# Patient Record
Sex: FEMALE | Race: ASIAN | NOT HISPANIC OR LATINO | Employment: FULL TIME | URBAN - METROPOLITAN AREA
[De-identification: names, ages, dates, MRNs, and addresses within clinical notes are randomized per-mention and may not be internally consistent; named-entity substitution may affect disease eponyms.]

---

## 2017-06-06 DIAGNOSIS — E03.9 HYPOTHYROIDISM: ICD-10-CM

## 2017-11-14 ENCOUNTER — ALLSCRIPTS OFFICE VISIT (OUTPATIENT)
Dept: OTHER | Facility: OTHER | Age: 47
End: 2017-11-14

## 2017-11-15 ENCOUNTER — LAB CONVERSION - ENCOUNTER (OUTPATIENT)
Dept: OTHER | Facility: OTHER | Age: 47
End: 2017-11-15

## 2017-11-15 LAB
A/G RATIO (HISTORICAL): 1.2 (CALC) (ref 1–2.5)
ALBUMIN SERPL BCP-MCNC: 4.4 G/DL (ref 3.6–5.1)
ALP SERPL-CCNC: 92 U/L (ref 33–115)
ALT SERPL W P-5'-P-CCNC: 24 U/L (ref 6–29)
AST SERPL W P-5'-P-CCNC: 24 U/L (ref 10–35)
BASOPHILS # BLD AUTO: 0.5 %
BASOPHILS # BLD AUTO: 34 CELLS/UL (ref 0–200)
BILIRUB SERPL-MCNC: 0.4 MG/DL (ref 0.2–1.2)
BUN SERPL-MCNC: 13 MG/DL (ref 7–25)
BUN/CREA RATIO (HISTORICAL): NORMAL (CALC) (ref 6–22)
CALCIUM SERPL-MCNC: 9.6 MG/DL (ref 8.6–10.2)
CHLORIDE SERPL-SCNC: 105 MMOL/L (ref 98–110)
CLINICAL COMMENT (HISTORICAL): ABNORMAL
CO2 SERPL-SCNC: 26 MMOL/L (ref 20–31)
CREAT SERPL-MCNC: 0.75 MG/DL (ref 0.5–1.1)
DEPRECATED RDW RBC AUTO: 16.6 % (ref 11–15)
EGFR AFRICAN AMERICAN (HISTORICAL): 108 ML/MIN/1.73M2
EGFR-AMERICAN CALC (HISTORICAL): 94 ML/MIN/1.73M2
EOSINOPHIL # BLD AUTO: 174 CELLS/UL (ref 15–500)
EOSINOPHIL # BLD AUTO: 2.6 %
GAMMA GLOBULIN (HISTORICAL): 3.6 G/DL (CALC) (ref 1.9–3.7)
GLUCOSE (HISTORICAL): 85 MG/DL (ref 65–99)
HCT VFR BLD AUTO: 35.8 % (ref 35–45)
HGB BLD-MCNC: 10.9 G/DL (ref 11.7–15.5)
LYMPHOCYTES # BLD AUTO: 2338 CELLS/UL (ref 850–3900)
LYMPHOCYTES # BLD AUTO: 34.9 %
MCH RBC QN AUTO: 20.2 PG (ref 27–33)
MCHC RBC AUTO-ENTMCNC: 30.4 G/DL (ref 32–36)
MCV RBC AUTO: 66.3 FL (ref 80–100)
MONOCYTES # BLD AUTO: 516 CELLS/UL (ref 200–950)
MONOCYTES (HISTORICAL): 7.7 %
NEUTROPHILS # BLD AUTO: 3638 CELLS/UL (ref 1500–7800)
NEUTROPHILS # BLD AUTO: 54.3 %
PLATELET # BLD AUTO: 201 THOUSAND/UL (ref 140–400)
PMV BLD AUTO: 11.6 FL (ref 7.5–12.5)
POTASSIUM SERPL-SCNC: 4.6 MMOL/L (ref 3.5–5.3)
RBC # BLD AUTO: 5.4 MILLION/UL (ref 3.8–5.1)
RBC MORPHOLOGY (HISTORICAL): ABNORMAL
SODIUM SERPL-SCNC: 141 MMOL/L (ref 135–146)
T4 FREE SERPL-MCNC: 1.1 NG/DL (ref 0.8–1.8)
THYROGLOBULIN AB (HISTORICAL): 1 IU/ML
TOTAL PROTEIN (HISTORICAL): 8 G/DL (ref 6.1–8.1)
TSH SERPL DL<=0.05 MIU/L-ACNC: 4.86 MIU/L
WBC # BLD AUTO: 6.7 THOUSAND/UL (ref 3.8–10.8)

## 2017-11-20 NOTE — PROGRESS NOTES
Assessment    1  Hypothyroidism (244 9) (E03 9)   2  Psoriasis (696 1) (L40 9)    Plan  Flu vaccine need    · Fluzone Quadrivalent Intramuscular Suspension  Hypothyroidism    · Levothyroxine Sodium 75 MCG Oral Tablet; TAKE 1 TABLET DAILY  Hypothyroidism, Psoriasis    · (1) ANTITHYROGLOBULIN ANTIBODY; Status:Complete;   Done: 52TKD3415 12:00AM   · (1) CBC/PLT/DIFF; Status:Complete;   Done: 66BSV3812 12:00AM   · (1) COMPREHENSIVE METABOLIC PANEL; Status:Complete;   Done: 35KYN519302:47IP   · (1) TSH WITH FT4 REFLEX; Status: In Progress - Specimen/Data Collected;   Done: InOffice Collection   · *(Q) VITAMIN D, 25-HYDROXY, LC/MS/MS; Status:Hold For - Exact Date; Requested for: InOffice Collection;   Psoriasis    · Betamethasone Dipropionate 0 05 % External Cream; APPLY SPARINGLY TOAFFECTED AREA(S) TWICE DAILY    Discussion/Summary    Recommend follow-up with dermatologist if no improvement in symptoms  Follow-up with gynecologist annually and obtain annual mammogram       Chief Complaint  not fasting - [facility]   Patient is here today for follow up of chronic conditions described in HPI  History of Present Illness  Patient here for recheck on psoriasis and hypothyroidism  She is generally feeling well  The patient is being seen for follow-up of psoriasis  The patient reports doing well  She has had no significant interval events  Interval symptoms:  stable skin scaling,-- stable plaque-like lesions-- and-- denies pustules  The patient is being seen for follow-up of hypothyroidism of undetermined etiology  The patient reports no change in the condition  She has had no significant interval events  The patient is currently asymptomatic  Review of Systems   Constitutional: No fever, no chills, feels well, no tiredness, no recent weight gain or weight loss  Eyes: No complaints of eye pain, no red eyes, no eyesight problems, no discharge, no dry eyes, no itching of eyes    ENT: no complaints of earache, no loss of hearing, no nose bleeds, no nasal discharge, no sore throat, no hoarseness  Cardiovascular: No complaints of slow heart rate, no fast heart rate, no chest pain, no palpitations, no leg claudication, no lower extremity edema  Respiratory: No complaints of shortness of breath, no wheezing, no cough, no SOB on exertion, no orthopnea, no PND  Gastrointestinal: No complaints of abdominal pain, no constipation, no nausea or vomiting, no diarrhea, no bloody stools  Genitourinary: No complaints of dysuria, no incontinence, no pelvic pain, no dysmenorrhea, no vaginal discharge or bleeding  Musculoskeletal: No complaints of arthralgias, no myalgias, no joint swelling or stiffness, no limb pain or swelling  Integumentary: No complaints of skin rash or lesions, no itching, no skin wounds, no breast pain or lump  Neurological: No complaints of headache, no confusion, no convulsions, no numbness, no dizziness or fainting, no tingling, no limb weakness, no difficulty walking  Psychiatric: Not suicidal, no sleep disturbance, no anxiety or depression, no change in personality, no emotional problems  Endocrine: No complaints of proptosis, no hot flashes, no muscle weakness, no deepening of the voice, no feelings of weakness  Hematologic/Lymphatic: No complaints of swollen glands, no swollen glands in the neck, does not bleed easily, does not bruise easily  Active Problems    1  Atopic dermatitis (691 8) (L20 9)   2  Bilateral knee pain (719 46) (M25 561,M25 562)   3  Breast screening (V76 10) (Z12 31)   4  Constipation (564 00) (K59 00)   5  Encounter for routine gynecological examination with Papanicolaou smear of cervix (V72 31,V76 2) (Z01 419)   6  Encounter for screening mammogram for malignant neoplasm of breast (V76 12) (Z12 31)   7  Hypothyroidism (244 9) (E03 9)   8  Pelvic pain in female (625 9) (R10 2)   9   Snoring (786 09) (R06 83)    Past Medical History    The active problems and past medical history were reviewed and updated today  Surgical History    1  History of Exploratory Laparoscopy    Family History  Mother    1  Family history of    2  Family history of Uterine cancer    Social History     · Never a smoker  The social history was reviewed and updated today  The social history was reviewed and is unchanged  Current Meds   1  FiberCon 625 MG Oral Tablet; TAKE 1 TABLET Daily for constipation; Therapy: 84HVF1391 to (Frutoso Chasten)  Requested for: 83MDU5365; Last Rx:2016 Ordered   2  Iron TABS; Therapy: (Recorded:98Cns5097) to Recorded   3  Levothyroxine Sodium 50 MCG Oral Tablet; TAKE 1 TABLET DAILY  Requested for: 04QLM8310; Last Rx:73Vgn0470; Status: ACTIVE - Renewal Voided Ordered    The medication list was reviewed and updated today  Allergies  1  No Known Drug Allergies    Vitals  Vital Signs    Recorded: 36DRP8913 01:38PM   Temperature 66 2 F   Systolic 095   Diastolic 80   Height 5 ft 3 in   Weight 166 lb    BMI Calculated 29 41   BSA Calculated 1 79       Physical Exam   Constitutional  General appearance: No acute distress, well appearing and well nourished  Eyes  Conjunctiva and lids: No swelling, erythema or discharge  Pupils and irises: Equal, round and reactive to light  Ears, Nose, Mouth, and Throat  External inspection of ears and nose: Normal    Otoscopic examination: Tympanic membranes translucent with normal light reflex  Canals patent without erythema  Nasal mucosa, septum, and turbinates: Normal without edema or erythema  Oropharynx: Normal with no erythema, edema, exudate or lesions  Pulmonary  Respiratory effort: No increased work of breathing or signs of respiratory distress  Auscultation of lungs: Clear to auscultation  Cardiovascular  Palpation of heart: Normal PMI, no thrills  Auscultation of heart: Normal rate and rhythm, normal S1 and S2, without murmurs     Examination of extremities for edema and/or varicosities: Normal  Carotid pulses: Normal    Abdomen  Abdomen: Non-tender, no masses  Liver and spleen: No hepatomegaly or splenomegaly  Lymphatic  Palpation of lymph nodes in neck: No lymphadenopathy  Musculoskeletal  Gait and station: Normal    Digits and nails: Normal without clubbing or cyanosis  Inspection/palpation of joints, bones, and muscles: Normal    Skin  Skin and subcutaneous tissue: Normal without rashes or lesions  Neurologic  Cranial nerves: Cranial nerves 2-12 intact  Reflexes: 2+ and symmetric  Sensation: No sensory loss  Psychiatric  Orientation to person, place, and time: Normal    Mood and affect: Normal          Results/Data  (1) CBC/PLT/DIFF [id number] 12:00AM [place]     Test Name Result Flag Reference   WHITE BLOOD CELL COUNT 6 7 Thousand/uL  3 8-10 8   RED BLOOD CELL COUNT 5 40 Million/uL H 3 80-5 10   HEMOGLOBIN 10 9 g/dL L 11 7-15 5   HEMATOCRIT 35 8 %  35 0-45 0   MCV 66 3 fL L 80 0-100 0   MCH 20 2 pg L 27 0-33 0   MCHC 30 4 g/dL L 32 0-36 0   RDW 16 6 % H 11 0-15 0   PLATELET COUNT 684 Thousand/uL  140-400   ABSOLUTE NEUTROPHILS 3638 cells/uL  9689-9762   ABSOLUTE LYMPHOCYTES 2338 cells/uL  850-3900   ABSOLUTE MONOCYTES 516 cells/uL  200-950   ABSOLUTE EOSINOPHILS 174 cells/uL     ABSOLUTE BASOPHILS 34 cells/uL  0-200   NEUTROPHILS 54 3 %     LYMPHOCYTES 34 9 %     MONOCYTES 7 7 %     EOSINOPHILS 2 6 %     BASOPHILS 0 5 %     CBC MORPHOLOGY   NORMAL     Tear-drop cells 1 + Target cells 1 + Anisocytosis 1 + Microcytosis 2 + Poikilocytosis 1 + Polychromasia 1 + Hypochromasia 2 +    See Below       Although an "automated CBC" was ordered, our instrumentation detected an abnormality on your patient's specimen requiring us to perform a manual review     MPV 11 6 fL  7 5-12 5     (1) COMPREHENSIVE METABOLIC PANEL [id number] 32:02FO [facility]     Test Name Result Flag Reference   GLUCOSE 85 mg/dL  65-99   Fasting reference interval   UREA NITROGEN (BUN) 13 mg/dL  7-25 CREATININE 0 75 mg/dL  0 50-1 10   eGFR NON-AFR  AMERICAN 94 mL/min/1 73m2  > OR = 60   eGFR AFRICAN AMERICAN 108 mL/min/1 73m2  > OR = 60   BUN/CREATININE RATIO   1-59   NOT APPLICABLE (calc)   SODIUM 141 mmol/L  135-146   POTASSIUM 4 6 mmol/L  3 5-5 3   CHLORIDE 105 mmol/L     CARBON DIOXIDE 26 mmol/L  20-31   CALCIUM 9 6 mg/dL  8 6-10 2   PROTEIN, TOTAL 8 0 g/dL  6 1-8 1   ALBUMIN 4 4 g/dL  3 6-5 1   GLOBULIN 3 6 g/dL (calc)  1 9-3 7   ALBUMIN/GLOBULIN RATIO 1 2 (calc)  1 0-2 5   BILIRUBIN, TOTAL 0 4 mg/dL  0 2-1 2   ALKALINE PHOSPHATASE 92 U/L     AST 24 U/L  10-35   ALT 24 U/L  6-29     (1) ANTITHYROGLOBULIN ANTIBODY [id number] 12:00AM [place]     Test Name Result Flag Reference   THYROGLOBULIN ANTIBODIES 1 IU/mL  < or = 1     (Q) TSH, 3RD GENERATION W/REFLEX TO FT4 [id number] 12:00AM [place]     Test Name Result Flag Reference   TSH W/REFLEX TO FT4 4 86 mIU/L H      Reference Range                       > or = 20 Years  0 40-4 50                            Pregnancy Ranges           First trimester    0 26-2 66           Second trimester   0 55-2 73           Third trimester    0 43-2 91   T4, FREE 1 1 ng/dL  0 8-1 8       Future Appointments    Date/Time Provider Specialty Site   11/27/2017 01:30 PM JEANNETTE Perkins   [facility]st[facility]       Signatures   Electronically signed by : Wai Patel DO; Nov 19 2017  6:31PM EST                       (Author)

## 2017-11-27 DIAGNOSIS — Z12.31 ENCOUNTER FOR SCREENING MAMMOGRAM FOR MALIGNANT NEOPLASM OF BREAST: ICD-10-CM

## 2017-11-27 DIAGNOSIS — R10.2 PELVIC AND PERINEAL PAIN: ICD-10-CM

## 2017-12-08 ENCOUNTER — GENERIC CONVERSION - ENCOUNTER (OUTPATIENT)
Dept: OTHER | Facility: OTHER | Age: 47
End: 2017-12-08

## 2017-12-08 PROCEDURE — G0145 SCR C/V CYTO,THINLAYER,RESCR: HCPCS | Performed by: OBSTETRICS & GYNECOLOGY

## 2017-12-08 PROCEDURE — 87624 HPV HI-RISK TYP POOLED RSLT: CPT | Performed by: OBSTETRICS & GYNECOLOGY

## 2017-12-11 ENCOUNTER — LAB REQUISITION (OUTPATIENT)
Dept: LAB | Facility: HOSPITAL | Age: 47
End: 2017-12-11
Payer: COMMERCIAL

## 2017-12-11 DIAGNOSIS — Z01.419 ENCOUNTER FOR GYNECOLOGICAL EXAMINATION WITHOUT ABNORMAL FINDING: ICD-10-CM

## 2017-12-13 LAB
HPV RRNA GENITAL QL NAA+PROBE: NORMAL
LAB AP GYN PRIMARY INTERPRETATION: NORMAL
Lab: NORMAL

## 2017-12-14 ENCOUNTER — GENERIC CONVERSION - ENCOUNTER (OUTPATIENT)
Dept: OTHER | Facility: OTHER | Age: 47
End: 2017-12-14

## 2018-01-11 NOTE — RESULT NOTES
Discussion/Summary   Blood testing shows thyroid levels need to be increased  New Rx sent  Recheck TSH in 4 weeks  Verified Results  (1) CBC/PLT/DIFF [id number] 12:00AM Richard Jhonson     Test Name Result Flag Reference   WHITE BLOOD CELL COUNT 6 7 Thousand/uL  3 8-10 8   RED BLOOD CELL COUNT 5 40 Million/uL H 3 80-5 10   HEMOGLOBIN 10 9 g/dL L 11 7-15 5   HEMATOCRIT 35 8 %  35 0-45 0   MCV 66 3 fL L 80 0-100 0   MCH 20 2 pg L 27 0-33 0   MCHC 30 4 g/dL L 32 0-36 0   RDW 16 6 % H 11 0-15 0   PLATELET COUNT 023 Thousand/uL  140-400   ABSOLUTE NEUTROPHILS 3638 cells/uL  9459-0635   ABSOLUTE LYMPHOCYTES 2338 cells/uL  850-3900   ABSOLUTE MONOCYTES 516 cells/uL  200-950   ABSOLUTE EOSINOPHILS 174 cells/uL     ABSOLUTE BASOPHILS 34 cells/uL  0-200   NEUTROPHILS 54 3 %     LYMPHOCYTES 34 9 %     MONOCYTES 7 7 %     EOSINOPHILS 2 6 %     BASOPHILS 0 5 %     CBC MORPHOLOGY   NORMAL   Tear-drop cells 1 +  Target cells 1 +  Anisocytosis 1 +  Microcytosis 2 +  Poikilocytosis 1 +  Polychromasia 1 +  Hypochromasia 2 +    See Below     Although an "automated CBC" was ordered, our  instrumentation detected an abnormality on  your patient's specimen requiring us to perform  a manual review  MPV 11 6 fL  7 5-12 5     (1) COMPREHENSIVE METABOLIC PANEL [id number] 33:28DR Richard Johnson     Test Name Result Flag Reference   GLUCOSE 85 mg/dL  65-99   Fasting reference interval   UREA NITROGEN (BUN) 13 mg/dL  7-25   CREATININE 0 75 mg/dL  0 50-1 10   eGFR NON-AFR   AMERICAN 94 mL/min/1 73m2  > OR = 60   eGFR AFRICAN AMERICAN 108 mL/min/1 73m2  > OR = 60   BUN/CREATININE RATIO   6-18   NOT APPLICABLE (calc)   SODIUM 141 mmol/L  135-146   POTASSIUM 4 6 mmol/L  3 5-5 3   CHLORIDE 105 mmol/L     CARBON DIOXIDE 26 mmol/L  20-31   CALCIUM 9 6 mg/dL  8 6-10 2   PROTEIN, TOTAL 8 0 g/dL  6 1-8 1   ALBUMIN 4 4 g/dL  3 6-5 1   GLOBULIN 3 6 g/dL (calc)  1 9-3 7   ALBUMIN/GLOBULIN RATIO 1 2 (calc)  1 0-2 5   BILIRUBIN, TOTAL 0 4 mg/dL  0 2-1 2   ALKALINE PHOSPHATASE 92 U/L     AST 24 U/L  10-35   ALT 24 U/L  6-29     (1) ANTITHYROGLOBULIN ANTIBODY [id number] 12:00AM Con[facility]     Test Name Result Flag Reference   THYROGLOBULIN ANTIBODIES 1 IU/mL  < or = 1     (Q) TSH, 3RD GENERATION W/REFLEX TO FT4 [id number] 12:00AM [facility]     Test Name Result Flag Reference   TSH W/REFLEX TO FT4 4 86 mIU/L H    Reference Range                         > or = 20 Years  0 40-4 50                              Pregnancy Ranges            First trimester    0 26-2 66            Second trimester   0 55-2 73            Third trimester    0 43-2 91   T4, FREE 1 1 ng/dL  0 8-1 8       Plan  Flu vaccine need    · Fluzone Quadrivalent Intramuscular Suspension  Hypothyroidism    · Levothyroxine Sodium 75 MCG Oral Tablet; TAKE 1 TABLET DAILY  Hypothyroidism, Psoriasis    · (1) ANTITHYROGLOBULIN ANTIBODY; Status:Resulted - Requires Verification;   Done:  15UYC0407 12:00AM   · (1) CBC/PLT/DIFF; Status:Resulted - Requires Verification;   Done: [id number] 12:00AM   · (1) COMPREHENSIVE METABOLIC PANEL; Status:Resulted - Requires Verification;    Done: 21AGW7038 12:00AM   · (1) TSH WITH FT4 REFLEX; Status: In Progress - Specimen/Data Collected;   Done: In  Office Collection   · *(Q) VITAMIN D, 25-HYDROXY, LC/MS/MS; Status:Hold For - Exact Date; Requested  for: In Office Collection;   Psoriasis    · Betamethasone Dipropionate 0 05 % External Cream; APPLY SPARINGLY TO  AFFECTED AREA(S) TWICE DAILY

## 2018-01-14 VITALS
WEIGHT: 166 LBS | DIASTOLIC BLOOD PRESSURE: 80 MMHG | BODY MASS INDEX: 29.41 KG/M2 | SYSTOLIC BLOOD PRESSURE: 120 MMHG | HEIGHT: 63 IN | TEMPERATURE: 97.3 F

## 2018-01-14 NOTE — RESULT NOTES
Message   Thyroid levels appear low  Recommend increasing levothyroxine 50 Âµg daily  Recheck TSH again in 4 weeks  Verified Results  (1) LIPID PANEL, FASTING [id number] 07:44AM Myah [place]     Test Name Result Flag Reference   CHOLESTEROL, TOTAL 183 mg/dL  125-200   HDL CHOLESTEROL 70 mg/dL  > OR = 46   TRIGLICERIDES 985 mg/dL  <150   LDL-CHOLESTEROL 91 mg/dL (calc)  <130   Desirable range <100 mg/dL for patients with CHD or  diabetes and <70 mg/dL for diabetic patients with  known heart disease  CHOL/HDLC RATIO 2 6 (calc)  < OR = 5 0   NON HDL CHOLESTEROL 113 mg/dL (calc)     Target for non-HDL cholesterol is 30 mg/dL higher than   LDL cholesterol target         Plan  Hypothyroidism    · Levothyroxine Sodium 50 MCG Oral Tablet; TAKE 1 TABLET DAILY

## 2018-01-23 NOTE — RESULT NOTES
Verified Results  (1) THIN PREP PAP WITH IMAGING [id number] 04:20PM Janalyn [place]     Test Name Result Flag Reference   LAB AP CASE REPORT (Report)     Gynecologic Cytology Report            Case: [id number]                  Authorizing Provider: Giovani Mejia MD     Collected:      12/08/2017           First Screen:     KATINA Stone    Received:      12/12/2017 0831        Specimen:  LIQUID-BASED PAP, SCREENING, Endocervical   HPV HIGH RISK RESULT (Report)     HPV, High Risk: HPV NEG, HPV16 NEG, HPV18 NEG      Other High Risk HPV Negative, HPV 16 Negative, HPV 18 Negative  HPV types: 16,18,31,33,35,39,45,51,52,56,58,59,66 and 68 DNA are undetectable or below the pre-set threshold  [employer] FDA approved Sandra 4800 is utilized with strict adherence to the manufacturers instruction  manual to test for the presence of High-Risk HPV DNA, as well as HPV 16 and HPV 18  This instrument  has been validated by our laboratory and/or by the   A negative result does not preclude the presence of HPV infection because results depend on adequate  specimen collection, absence of inhibitors and sufficient DNA to be detected  Additionally, HPV negative  results are not intended to prevent women from proceeding to colposcopy if clinically warranted  Positive HPV test results indicate the presence of any one or more of the high risk types, but since patients  are often co-infected with low-risk types it does not rule out the presence of low-risk types in patients  with mixed infections  LAB AP GYN PRIMARY INTERPRETATION      Negative for intraepithelial lesion or malignancy  Electronically signed by KATINA Stone on 12/13/2017 at 4:20 PM   LAB AP GYN SPECIMEN ADEQUACY      Satisfactory for evaluation  Endocervical/transformation zone component present     LAB AP GYN ADDITIONAL INFORMATION (Report)     [employer] FDA approved ,  and ThinPrep Imaging System are   utilized with strict adherence to the 's instruction manual to   prepare gynecologic and non-gynecologic cytology specimens for the   production of ThinPrep slides as well as for gynecologic ThinPrep imaging  These processes have been validated by our laboratory and/or by the     The Pap test is not a diagnostic procedure and should not be used as the   sole means to detect cervical cancer  It is only a screening procedure to   aid in the detection of cervical cancer and its precursors  Both   false-negative and false-positive results have been experienced  Your   patient's test result should be interpreted in this context together with   the history and clinical findings

## 2018-01-24 VITALS
DIASTOLIC BLOOD PRESSURE: 76 MMHG | BODY MASS INDEX: 29.89 KG/M2 | HEIGHT: 62 IN | WEIGHT: 162.44 LBS | SYSTOLIC BLOOD PRESSURE: 112 MMHG

## 2018-09-25 ENCOUNTER — HOSPITAL ENCOUNTER (OUTPATIENT)
Dept: ULTRASOUND IMAGING | Facility: MEDICAL CENTER | Age: 48
Discharge: HOME | End: 2018-09-25
Payer: COMMERCIAL

## 2018-09-25 ENCOUNTER — HOSPITAL ENCOUNTER (OUTPATIENT)
Dept: MAMMOGRAPHY | Facility: MEDICAL CENTER | Age: 48
Discharge: HOME | End: 2018-09-25
Payer: COMMERCIAL

## 2018-09-25 DIAGNOSIS — R10.2 PELVIC AND PERINEAL PAIN: ICD-10-CM

## 2018-09-25 DIAGNOSIS — Z12.31 ENCOUNTER FOR SCREENING MAMMOGRAM FOR MALIGNANT NEOPLASM OF BREAST: ICD-10-CM

## 2018-09-25 PROCEDURE — 77067 SCR MAMMO BI INCL CAD: CPT

## 2018-09-25 PROCEDURE — 77063 BREAST TOMOSYNTHESIS BI: CPT

## 2018-09-25 PROCEDURE — 76856 US EXAM PELVIC COMPLETE: CPT

## 2018-09-25 PROCEDURE — 76830 TRANSVAGINAL US NON-OB: CPT

## 2018-12-11 RX ORDER — LEVOTHYROXINE SODIUM 0.07 MG/1
TABLET ORAL
Qty: 90 TABLET | Refills: 0 | OUTPATIENT
Start: 2018-12-11

## 2018-12-12 NOTE — TELEPHONE ENCOUNTER
Spoke to pt and advised her she needs an appt for refills, as she has not been seen since 11/2017  Pt made an appt for 12/17/18 for a f/u and med refills

## 2018-12-13 DIAGNOSIS — Z12.39 BREAST CANCER SCREENING: Primary | ICD-10-CM

## 2018-12-17 ENCOUNTER — OFFICE VISIT (OUTPATIENT)
Dept: FAMILY MEDICINE CLINIC | Facility: CLINIC | Age: 48
End: 2018-12-17
Payer: COMMERCIAL

## 2018-12-17 VITALS
TEMPERATURE: 98.2 F | HEIGHT: 63 IN | OXYGEN SATURATION: 99 % | WEIGHT: 173 LBS | HEART RATE: 72 BPM | DIASTOLIC BLOOD PRESSURE: 72 MMHG | BODY MASS INDEX: 30.65 KG/M2 | SYSTOLIC BLOOD PRESSURE: 112 MMHG

## 2018-12-17 DIAGNOSIS — Z23 NEED FOR IMMUNIZATION AGAINST INFLUENZA: ICD-10-CM

## 2018-12-17 DIAGNOSIS — E03.9 HYPOTHYROIDISM, UNSPECIFIED TYPE: Primary | ICD-10-CM

## 2018-12-17 DIAGNOSIS — L40.9 PSORIASIS: ICD-10-CM

## 2018-12-17 LAB
ALBUMIN SERPL BCP-MCNC: 3.9 G/DL (ref 3.5–5)
ALP SERPL-CCNC: 99 U/L (ref 46–116)
ALT SERPL W P-5'-P-CCNC: 37 U/L (ref 12–78)
ANION GAP SERPL CALCULATED.3IONS-SCNC: 6 MMOL/L (ref 4–13)
AST SERPL W P-5'-P-CCNC: 24 U/L (ref 5–45)
BASOPHILS # BLD AUTO: 0.03 THOUSANDS/ΜL (ref 0–0.1)
BASOPHILS NFR BLD AUTO: 1 % (ref 0–1)
BILIRUB SERPL-MCNC: 0.34 MG/DL (ref 0.2–1)
BUN SERPL-MCNC: 15 MG/DL (ref 5–25)
CALCIUM SERPL-MCNC: 9.7 MG/DL (ref 8.3–10.1)
CANCER AG125 SERPL-ACNC: 7.7 U/ML (ref 0–30)
CHLORIDE SERPL-SCNC: 104 MMOL/L (ref 100–108)
CHOLEST SERPL-MCNC: 170 MG/DL (ref 50–200)
CO2 SERPL-SCNC: 27 MMOL/L (ref 21–32)
CREAT SERPL-MCNC: 0.73 MG/DL (ref 0.6–1.3)
EOSINOPHIL # BLD AUTO: 0.1 THOUSAND/ΜL (ref 0–0.61)
EOSINOPHIL NFR BLD AUTO: 2 % (ref 0–6)
ERYTHROCYTE [DISTWIDTH] IN BLOOD BY AUTOMATED COUNT: 15.6 % (ref 11.6–15.1)
GFR SERPL CREATININE-BSD FRML MDRD: 96 ML/MIN/1.73SQ M
GLUCOSE P FAST SERPL-MCNC: 78 MG/DL (ref 65–99)
HCT VFR BLD AUTO: 36.7 % (ref 34.8–46.1)
HDLC SERPL-MCNC: 78 MG/DL (ref 40–60)
HGB BLD-MCNC: 10.9 G/DL (ref 11.5–15.4)
IMM GRANULOCYTES # BLD AUTO: 0.01 THOUSAND/UL (ref 0–0.2)
IMM GRANULOCYTES NFR BLD AUTO: 0 % (ref 0–2)
LDLC SERPL CALC-MCNC: 72 MG/DL (ref 0–100)
LYMPHOCYTES # BLD AUTO: 1.85 THOUSANDS/ΜL (ref 0.6–4.47)
LYMPHOCYTES NFR BLD AUTO: 29 % (ref 14–44)
MCH RBC QN AUTO: 20.4 PG (ref 26.8–34.3)
MCHC RBC AUTO-ENTMCNC: 29.7 G/DL (ref 31.4–37.4)
MCV RBC AUTO: 69 FL (ref 82–98)
MONOCYTES # BLD AUTO: 0.47 THOUSAND/ΜL (ref 0.17–1.22)
MONOCYTES NFR BLD AUTO: 7 % (ref 4–12)
NEUTROPHILS # BLD AUTO: 4.03 THOUSANDS/ΜL (ref 1.85–7.62)
NEUTS SEG NFR BLD AUTO: 61 % (ref 43–75)
NRBC BLD AUTO-RTO: 0 /100 WBCS
PLATELET # BLD AUTO: 222 THOUSANDS/UL (ref 149–390)
PMV BLD AUTO: 11.2 FL (ref 8.9–12.7)
POTASSIUM SERPL-SCNC: 5.1 MMOL/L (ref 3.5–5.3)
PROT SERPL-MCNC: 8 G/DL (ref 6.4–8.2)
RBC # BLD AUTO: 5.34 MILLION/UL (ref 3.81–5.12)
SODIUM SERPL-SCNC: 137 MMOL/L (ref 136–145)
TRIGL SERPL-MCNC: 100 MG/DL
TSH SERPL DL<=0.05 MIU/L-ACNC: 0.71 UIU/ML (ref 0.36–3.74)
WBC # BLD AUTO: 6.49 THOUSAND/UL (ref 4.31–10.16)

## 2018-12-17 PROCEDURE — 1036F TOBACCO NON-USER: CPT | Performed by: FAMILY MEDICINE

## 2018-12-17 PROCEDURE — 36415 COLL VENOUS BLD VENIPUNCTURE: CPT | Performed by: FAMILY MEDICINE

## 2018-12-17 PROCEDURE — 99214 OFFICE O/P EST MOD 30 MIN: CPT | Performed by: FAMILY MEDICINE

## 2018-12-17 PROCEDURE — 3008F BODY MASS INDEX DOCD: CPT | Performed by: FAMILY MEDICINE

## 2018-12-17 PROCEDURE — 80053 COMPREHEN METABOLIC PANEL: CPT | Performed by: FAMILY MEDICINE

## 2018-12-17 PROCEDURE — 80061 LIPID PANEL: CPT | Performed by: FAMILY MEDICINE

## 2018-12-17 PROCEDURE — 90682 RIV4 VACC RECOMBINANT DNA IM: CPT

## 2018-12-17 PROCEDURE — 85025 COMPLETE CBC W/AUTO DIFF WBC: CPT | Performed by: FAMILY MEDICINE

## 2018-12-17 PROCEDURE — 86304 IMMUNOASSAY TUMOR CA 125: CPT | Performed by: FAMILY MEDICINE

## 2018-12-17 PROCEDURE — 84443 ASSAY THYROID STIM HORMONE: CPT | Performed by: FAMILY MEDICINE

## 2018-12-17 PROCEDURE — 90471 IMMUNIZATION ADMIN: CPT

## 2018-12-17 RX ORDER — LEVOTHYROXINE SODIUM 0.07 MG/1
1 TABLET ORAL DAILY
COMMUNITY
End: 2018-12-21 | Stop reason: SDUPTHER

## 2018-12-17 NOTE — PROGRESS NOTES
Assessment/Plan:  Await blood test results  We discussed obtaining  and the benefits versus risks of doing so  There where it is not a typical screening test but considering family history we will do this today  Consider follow-up with OBGYN  Recommend annual mammography  Adjust thyroid medication as needed  Return to office in 6-12 months for recheck  Sooner if needed  No problem-specific Assessment & Plan notes found for this encounter  Diagnoses and all orders for this visit:    Hypothyroidism, unspecified type  -     CBC and differential  -     Comprehensive metabolic panel  -     Lipid Panel with Direct LDL reflex  -     TSH, 3rd generation with Free T4 reflex  -     ; Future  -         Psoriasis    Need for immunization against influenza  -     influenza vaccine, 5157-6405, quadrivalent, recombinant, PF, 0 5 mL, for patients 18 yr+ (FLUBLOK)    Other orders  -     levothyroxine 75 mcg tablet; Take 1 tablet by mouth daily          Subjective:      Patient ID: Araceli Lomas is a 46 y o  female  Patient here for recheck on chronic conditions  Here with daughter who translates for her  She is needing blood testing done for recheck on her thyroid  She does have a family history of ovarian cancer and daughter and patient would like to have  done  She does see a gynecologist annually  She is otherwise feeling well at this time  The following portions of the patient's history were reviewed and updated as appropriate: allergies, current medications, past family history, past medical history, past social history, past surgical history and problem list     Review of Systems   Constitutional: Negative  HENT: Negative  Eyes: Negative  Respiratory: Negative  Cardiovascular: Negative  Gastrointestinal: Negative  Endocrine: Negative  Genitourinary: Negative  Musculoskeletal: Negative  Skin: Negative  Allergic/Immunologic: Negative  Neurological: Negative  Hematological: Negative  Psychiatric/Behavioral: Negative  Objective:      /72 (BP Location: Left arm, Patient Position: Sitting, Cuff Size: Standard)   Pulse 72   Temp 98 2 °F (36 8 °C) (Tympanic)   Ht 5' 2 89" (1 598 m)   Wt 78 5 kg (173 lb)   LMP  (LMP Unknown)   SpO2 99%   BMI 30 75 kg/m²          Physical Exam   Constitutional: She is oriented to person, place, and time  She appears well-developed and well-nourished  HENT:   Head: Normocephalic and atraumatic  Right Ear: External ear normal  Tympanic membrane is not erythematous and not bulging  Left Ear: External ear normal  Tympanic membrane is not erythematous and not bulging  Nose: Nose normal    Mouth/Throat: Oropharynx is clear and moist and mucous membranes are normal  No oral lesions  No oropharyngeal exudate  Eyes: Conjunctivae and EOM are normal  Right eye exhibits no discharge  Left eye exhibits no discharge  No scleral icterus  Neck: Normal range of motion  Neck supple  No thyromegaly present  Cardiovascular: Normal rate, regular rhythm and normal heart sounds  Exam reveals no gallop and no friction rub  No murmur heard  Pulmonary/Chest: Effort normal  No respiratory distress  She has no wheezes  She has no rales  She exhibits no tenderness  Abdominal: Soft  Bowel sounds are normal  She exhibits no distension and no mass  There is no tenderness  There is no rebound and no guarding  Musculoskeletal: Normal range of motion  She exhibits no edema, tenderness or deformity  Lymphadenopathy:     She has no cervical adenopathy  Neurological: She is alert and oriented to person, place, and time  She has normal reflexes  No cranial nerve deficit  She exhibits normal muscle tone  Coordination normal    Skin: Skin is warm and dry  No rash noted  No erythema  No pallor  Psychiatric: She has a normal mood and affect  Her behavior is normal    Vitals reviewed

## 2018-12-21 DIAGNOSIS — E03.9 HYPOTHYROIDISM, UNSPECIFIED TYPE: Primary | ICD-10-CM

## 2018-12-21 RX ORDER — LEVOTHYROXINE SODIUM 0.07 MG/1
75 TABLET ORAL DAILY
Qty: 90 TABLET | Refills: 2 | Status: SHIPPED | OUTPATIENT
Start: 2018-12-21 | End: 2019-09-21 | Stop reason: SDUPTHER

## 2019-01-24 ENCOUNTER — TELEPHONE (OUTPATIENT)
Dept: FAMILY MEDICINE CLINIC | Facility: CLINIC | Age: 49
End: 2019-01-24

## 2019-02-11 ENCOUNTER — OFFICE VISIT (OUTPATIENT)
Dept: FAMILY MEDICINE CLINIC | Facility: CLINIC | Age: 49
End: 2019-02-11
Payer: COMMERCIAL

## 2019-02-11 ENCOUNTER — APPOINTMENT (OUTPATIENT)
Dept: RADIOLOGY | Facility: MEDICAL CENTER | Age: 49
End: 2019-02-11
Payer: COMMERCIAL

## 2019-02-11 VITALS
HEIGHT: 63 IN | SYSTOLIC BLOOD PRESSURE: 132 MMHG | HEART RATE: 66 BPM | BODY MASS INDEX: 30.83 KG/M2 | OXYGEN SATURATION: 99 % | TEMPERATURE: 97.7 F | DIASTOLIC BLOOD PRESSURE: 80 MMHG | WEIGHT: 174 LBS

## 2019-02-11 DIAGNOSIS — M25.562 CHRONIC PAIN OF LEFT KNEE: Primary | ICD-10-CM

## 2019-02-11 DIAGNOSIS — M25.562 CHRONIC PAIN OF LEFT KNEE: ICD-10-CM

## 2019-02-11 DIAGNOSIS — G89.29 CHRONIC PAIN OF LEFT KNEE: ICD-10-CM

## 2019-02-11 DIAGNOSIS — G89.29 CHRONIC PAIN OF LEFT KNEE: Primary | ICD-10-CM

## 2019-02-11 PROCEDURE — 1036F TOBACCO NON-USER: CPT | Performed by: FAMILY MEDICINE

## 2019-02-11 PROCEDURE — 73562 X-RAY EXAM OF KNEE 3: CPT

## 2019-02-11 PROCEDURE — 3008F BODY MASS INDEX DOCD: CPT | Performed by: FAMILY MEDICINE

## 2019-02-11 PROCEDURE — 99213 OFFICE O/P EST LOW 20 MIN: CPT | Performed by: FAMILY MEDICINE

## 2019-02-11 RX ORDER — MELOXICAM 15 MG/1
15 TABLET ORAL DAILY
Qty: 30 TABLET | Refills: 0 | Status: SHIPPED | OUTPATIENT
Start: 2019-02-11 | End: 2019-03-26 | Stop reason: SDUPTHER

## 2019-02-11 NOTE — PROGRESS NOTES
Assessment/Plan:  Recommend venous duplex to rule out clot  X-ray as well  Consider follow-up with orthopedic knee specialist if symptoms persist or worsen  Side effect profile medication reviewed  Recommend return to office for re-evaluation if symptoms persist or worsen or new symptoms develop  No problem-specific Assessment & Plan notes found for this encounter  Diagnoses and all orders for this visit:    Chronic pain of left knee  -     meloxicam (MOBIC) 15 mg tablet; Take 1 tablet (15 mg total) by mouth daily  -     VAS lower limb venous duplex study, unilateral/limited; Future  -     XR knee 3 vw left non injury; Future          Subjective:      Patient ID: Polo Brown is a 46 y o  female  Patient is here with her son who translates for her  She has chronic left knee pain intermittently over the last 6 months  Denies any trauma to the knee  Pain is localized to the posterior and lateral aspects of the knee  Denies any swelling  Knee Pain          The following portions of the patient's history were reviewed and updated as appropriate: allergies, current medications, past family history, past medical history, past social history, past surgical history and problem list     Review of Systems   Constitutional: Negative  HENT: Negative  Eyes: Negative  Respiratory: Negative  Cardiovascular: Negative  Gastrointestinal: Negative  Endocrine: Negative  Genitourinary: Negative  Musculoskeletal: Positive for arthralgias  Skin: Negative  Allergic/Immunologic: Negative  Neurological: Negative  Hematological: Negative  Psychiatric/Behavioral: Negative            Objective:      /80 (BP Location: Left arm, Patient Position: Sitting, Cuff Size: Standard)   Pulse 66   Temp 97 7 °F (36 5 °C) (Oral)   Ht 5' 2 99" (1 6 m)   Wt 78 9 kg (174 lb)   LMP  (LMP Unknown)   SpO2 99%   BMI 30 83 kg/m²          Physical Exam   Constitutional: She is oriented to person, place, and time  She appears well-developed and well-nourished  HENT:   Head: Normocephalic and atraumatic  Right Ear: External ear normal  Tympanic membrane is not erythematous and not bulging  Left Ear: External ear normal  Tympanic membrane is not erythematous and not bulging  Nose: Nose normal    Mouth/Throat: Oropharynx is clear and moist and mucous membranes are normal  No oral lesions  No oropharyngeal exudate  Eyes: Conjunctivae and EOM are normal  Right eye exhibits no discharge  Left eye exhibits no discharge  No scleral icterus  Neck: Normal range of motion  Neck supple  No thyromegaly present  Cardiovascular: Normal rate, regular rhythm and normal heart sounds  Exam reveals no gallop and no friction rub  No murmur heard  Pulmonary/Chest: Effort normal  No respiratory distress  She has no wheezes  She has no rales  She exhibits no tenderness  Abdominal: Soft  Bowel sounds are normal  She exhibits no distension and no mass  There is no tenderness  There is no rebound and no guarding  Musculoskeletal: Normal range of motion  She exhibits no edema, tenderness or deformity  Lymphadenopathy:     She has no cervical adenopathy  Neurological: She is alert and oriented to person, place, and time  She has normal reflexes  No cranial nerve deficit  She exhibits normal muscle tone  Coordination normal    Skin: Skin is warm and dry  No rash noted  No erythema  No pallor  Psychiatric: She has a normal mood and affect  Her behavior is normal    Vitals reviewed

## 2019-02-14 ENCOUNTER — HOSPITAL ENCOUNTER (OUTPATIENT)
Dept: NON INVASIVE DIAGNOSTICS | Facility: CLINIC | Age: 49
Discharge: HOME | End: 2019-02-14
Payer: COMMERCIAL

## 2019-02-14 DIAGNOSIS — M25.562 CHRONIC PAIN OF LEFT KNEE: ICD-10-CM

## 2019-02-14 DIAGNOSIS — G89.29 CHRONIC PAIN OF LEFT KNEE: ICD-10-CM

## 2019-02-14 PROCEDURE — 93971 EXTREMITY STUDY: CPT

## 2019-02-14 PROCEDURE — 93971 EXTREMITY STUDY: CPT | Performed by: SURGERY

## 2019-03-24 DIAGNOSIS — G89.29 CHRONIC PAIN OF LEFT KNEE: ICD-10-CM

## 2019-03-24 DIAGNOSIS — M25.562 CHRONIC PAIN OF LEFT KNEE: ICD-10-CM

## 2019-03-24 RX ORDER — MELOXICAM 15 MG/1
TABLET ORAL
Qty: 30 TABLET | Refills: 0 | OUTPATIENT
Start: 2019-03-24

## 2019-03-26 DIAGNOSIS — M25.562 CHRONIC PAIN OF LEFT KNEE: ICD-10-CM

## 2019-03-26 DIAGNOSIS — G89.29 CHRONIC PAIN OF LEFT KNEE: ICD-10-CM

## 2019-03-26 RX ORDER — MELOXICAM 15 MG/1
15 TABLET ORAL DAILY
Qty: 30 TABLET | Refills: 0 | Status: SHIPPED | OUTPATIENT
Start: 2019-03-26 | End: 2019-05-08 | Stop reason: SDUPTHER

## 2019-05-06 DIAGNOSIS — M25.562 CHRONIC PAIN OF LEFT KNEE: ICD-10-CM

## 2019-05-06 DIAGNOSIS — G89.29 CHRONIC PAIN OF LEFT KNEE: ICD-10-CM

## 2019-05-06 RX ORDER — MELOXICAM 15 MG/1
TABLET ORAL
Qty: 30 TABLET | Refills: 0 | OUTPATIENT
Start: 2019-05-06

## 2019-05-08 DIAGNOSIS — G89.29 CHRONIC PAIN OF LEFT KNEE: ICD-10-CM

## 2019-05-08 DIAGNOSIS — M25.562 CHRONIC PAIN OF LEFT KNEE: ICD-10-CM

## 2019-05-08 RX ORDER — MELOXICAM 15 MG/1
15 TABLET ORAL DAILY
Qty: 30 TABLET | Refills: 1 | Status: SHIPPED | OUTPATIENT
Start: 2019-05-08 | End: 2019-11-26 | Stop reason: SDUPTHER

## 2019-09-21 DIAGNOSIS — E03.9 HYPOTHYROIDISM, UNSPECIFIED TYPE: ICD-10-CM

## 2019-09-25 RX ORDER — LEVOTHYROXINE SODIUM 0.07 MG/1
TABLET ORAL
Qty: 30 TABLET | Refills: 8 | Status: SHIPPED | OUTPATIENT
Start: 2019-09-25 | End: 2020-06-21

## 2019-11-26 ENCOUNTER — OFFICE VISIT (OUTPATIENT)
Dept: FAMILY MEDICINE CLINIC | Facility: CLINIC | Age: 49
End: 2019-11-26
Payer: COMMERCIAL

## 2019-11-26 VITALS
OXYGEN SATURATION: 99 % | HEIGHT: 62 IN | HEART RATE: 84 BPM | TEMPERATURE: 99.1 F | DIASTOLIC BLOOD PRESSURE: 70 MMHG | WEIGHT: 170 LBS | SYSTOLIC BLOOD PRESSURE: 114 MMHG | BODY MASS INDEX: 31.28 KG/M2

## 2019-11-26 DIAGNOSIS — Z12.11 ENCOUNTER FOR SCREENING COLONOSCOPY: ICD-10-CM

## 2019-11-26 DIAGNOSIS — L20.9 ATOPIC DERMATITIS, UNSPECIFIED TYPE: ICD-10-CM

## 2019-11-26 DIAGNOSIS — G89.29 CHRONIC PAIN OF LEFT KNEE: ICD-10-CM

## 2019-11-26 DIAGNOSIS — Z00.00 WELL ADULT EXAM: Primary | ICD-10-CM

## 2019-11-26 DIAGNOSIS — Z23 NEED FOR INFLUENZA VACCINATION: ICD-10-CM

## 2019-11-26 DIAGNOSIS — L40.9 PSORIASIS: ICD-10-CM

## 2019-11-26 DIAGNOSIS — E03.9 HYPOTHYROIDISM, UNSPECIFIED TYPE: ICD-10-CM

## 2019-11-26 DIAGNOSIS — M25.562 CHRONIC PAIN OF LEFT KNEE: ICD-10-CM

## 2019-11-26 LAB
ALBUMIN SERPL BCP-MCNC: 4.3 G/DL (ref 3.5–5)
ALP SERPL-CCNC: 96 U/L (ref 46–116)
ALT SERPL W P-5'-P-CCNC: 25 U/L (ref 12–78)
ANION GAP SERPL CALCULATED.3IONS-SCNC: 3 MMOL/L (ref 4–13)
AST SERPL W P-5'-P-CCNC: 17 U/L (ref 5–45)
BASOPHILS # BLD AUTO: 0.02 THOUSANDS/ΜL (ref 0–0.1)
BASOPHILS NFR BLD AUTO: 0 % (ref 0–1)
BILIRUB SERPL-MCNC: 0.31 MG/DL (ref 0.2–1)
BUN SERPL-MCNC: 12 MG/DL (ref 5–25)
CALCIUM SERPL-MCNC: 9.7 MG/DL (ref 8.3–10.1)
CHLORIDE SERPL-SCNC: 108 MMOL/L (ref 100–108)
CHOLEST SERPL-MCNC: 184 MG/DL (ref 50–200)
CO2 SERPL-SCNC: 28 MMOL/L (ref 21–32)
CREAT SERPL-MCNC: 0.72 MG/DL (ref 0.6–1.3)
EOSINOPHIL # BLD AUTO: 0.08 THOUSAND/ΜL (ref 0–0.61)
EOSINOPHIL NFR BLD AUTO: 2 % (ref 0–6)
ERYTHROCYTE [DISTWIDTH] IN BLOOD BY AUTOMATED COUNT: 16.6 % (ref 11.6–15.1)
EST. AVERAGE GLUCOSE BLD GHB EST-MCNC: 126 MG/DL
GFR SERPL CREATININE-BSD FRML MDRD: 97 ML/MIN/1.73SQ M
GLUCOSE SERPL-MCNC: 93 MG/DL (ref 65–140)
HBA1C MFR BLD: 6 % (ref 4.2–6.3)
HCT VFR BLD AUTO: 36.3 % (ref 34.8–46.1)
HDLC SERPL-MCNC: 65 MG/DL
HGB BLD-MCNC: 10.8 G/DL (ref 11.5–15.4)
IMM GRANULOCYTES # BLD AUTO: 0.01 THOUSAND/UL (ref 0–0.2)
IMM GRANULOCYTES NFR BLD AUTO: 0 % (ref 0–2)
LDLC SERPL CALC-MCNC: 89 MG/DL (ref 0–100)
LYMPHOCYTES # BLD AUTO: 1.97 THOUSANDS/ΜL (ref 0.6–4.47)
LYMPHOCYTES NFR BLD AUTO: 37 % (ref 14–44)
MCH RBC QN AUTO: 20.1 PG (ref 26.8–34.3)
MCHC RBC AUTO-ENTMCNC: 29.8 G/DL (ref 31.4–37.4)
MCV RBC AUTO: 68 FL (ref 82–98)
MONOCYTES # BLD AUTO: 0.33 THOUSAND/ΜL (ref 0.17–1.22)
MONOCYTES NFR BLD AUTO: 6 % (ref 4–12)
NEUTROPHILS # BLD AUTO: 2.93 THOUSANDS/ΜL (ref 1.85–7.62)
NEUTS SEG NFR BLD AUTO: 55 % (ref 43–75)
NRBC BLD AUTO-RTO: 0 /100 WBCS
PLATELET # BLD AUTO: 222 THOUSANDS/UL (ref 149–390)
PMV BLD AUTO: 11.4 FL (ref 8.9–12.7)
POTASSIUM SERPL-SCNC: 5.2 MMOL/L (ref 3.5–5.3)
PROT SERPL-MCNC: 8.5 G/DL (ref 6.4–8.2)
RBC # BLD AUTO: 5.37 MILLION/UL (ref 3.81–5.12)
SODIUM SERPL-SCNC: 139 MMOL/L (ref 136–145)
TRIGL SERPL-MCNC: 152 MG/DL
TSH SERPL DL<=0.05 MIU/L-ACNC: 1.96 UIU/ML (ref 0.36–3.74)
WBC # BLD AUTO: 5.34 THOUSAND/UL (ref 4.31–10.16)

## 2019-11-26 PROCEDURE — 90471 IMMUNIZATION ADMIN: CPT

## 2019-11-26 PROCEDURE — 90682 RIV4 VACC RECOMBINANT DNA IM: CPT

## 2019-11-26 PROCEDURE — 84443 ASSAY THYROID STIM HORMONE: CPT | Performed by: FAMILY MEDICINE

## 2019-11-26 PROCEDURE — 99396 PREV VISIT EST AGE 40-64: CPT | Performed by: FAMILY MEDICINE

## 2019-11-26 PROCEDURE — 80061 LIPID PANEL: CPT | Performed by: FAMILY MEDICINE

## 2019-11-26 PROCEDURE — 3044F HG A1C LEVEL LT 7.0%: CPT | Performed by: FAMILY MEDICINE

## 2019-11-26 PROCEDURE — 83036 HEMOGLOBIN GLYCOSYLATED A1C: CPT | Performed by: FAMILY MEDICINE

## 2019-11-26 PROCEDURE — 80053 COMPREHEN METABOLIC PANEL: CPT | Performed by: FAMILY MEDICINE

## 2019-11-26 PROCEDURE — 36415 COLL VENOUS BLD VENIPUNCTURE: CPT | Performed by: FAMILY MEDICINE

## 2019-11-26 PROCEDURE — 85025 COMPLETE CBC W/AUTO DIFF WBC: CPT | Performed by: FAMILY MEDICINE

## 2019-11-26 RX ORDER — MELOXICAM 15 MG/1
15 TABLET ORAL DAILY
Qty: 30 TABLET | Refills: 1 | Status: SHIPPED | OUTPATIENT
Start: 2019-11-26 | End: 2022-04-21 | Stop reason: ALTCHOICE

## 2019-11-26 RX ORDER — BETAMETHASONE DIPROPIONATE 0.5 MG/G
CREAM TOPICAL 2 TIMES DAILY
Qty: 45 G | Refills: 2 | Status: SHIPPED | OUTPATIENT
Start: 2019-11-26 | End: 2022-04-21 | Stop reason: ALTCHOICE

## 2019-11-26 NOTE — PROGRESS NOTES
Assessment/Plan:  Patient appears to be doing well  Recommend colonoscopy  Recommend annual mammography  Anticipatory guidance provided  Blood testing drawn today  Await results  Recheck in 1 year  Sooner if needed  Card given for gastroenterologist for screening colonoscopy  No problem-specific Assessment & Plan notes found for this encounter  Diagnoses and all orders for this visit:    Need for influenza vaccination  -     influenza vaccine, 6292-4276, quadrivalent, recombinant, PF, 0 5 mL, for patients 18 yr+ (FLUBLOK)    Well adult exam    Encounter for screening colonoscopy  -     Ambulatory referral for colonoscopy; Future    Chronic pain of left knee  -     meloxicam (MOBIC) 15 mg tablet; Take 1 tablet (15 mg total) by mouth daily As needed for pain    Psoriasis  -     CBC and differential  -     Comprehensive metabolic panel  -     Lipid Panel with Direct LDL reflex  -     Hemoglobin A1C  -     TSH, 3rd generation with Free T4 reflex  -     betamethasone dipropionate (DIPROSONE) 0 05 % cream; Apply topically 2 (two) times a day    Atopic dermatitis, unspecified type    Hypothyroidism, unspecified type  -     CBC and differential  -     Comprehensive metabolic panel  -     Lipid Panel with Direct LDL reflex  -     Hemoglobin A1C  -     TSH, 3rd generation with Free T4 reflex          Subjective:      Patient ID: Melita Elizabeth is a 44 y o  female  Patient is here for annual well check  Here with her daughter who translates  Daughter states that her mother is generally doing well  She is due for colonoscopy  She does have occasional psoriasis flare-ups in the winter months    She has used a topical steroid cream for this in the past       The following portions of the patient's history were reviewed and updated as appropriate: allergies, current medications, past family history, past medical history, past social history, past surgical history and problem list     Review of Systems Constitutional: Negative  HENT: Negative  Eyes: Negative  Respiratory: Negative  Cardiovascular: Negative  Gastrointestinal: Negative  Endocrine: Negative  Genitourinary: Negative  Musculoskeletal: Negative  Skin: Negative  Allergic/Immunologic: Negative  Neurological: Negative  Hematological: Negative  Psychiatric/Behavioral: Negative  Objective:      /70 (BP Location: Left arm, Patient Position: Sitting, Cuff Size: Standard)   Pulse 84   Temp 99 1 °F (37 3 °C) (Tympanic)   Ht 5' 1 81" (1 57 m)   Wt 77 1 kg (170 lb)   LMP  (LMP Unknown)   SpO2 99%   BMI 31 28 kg/m²          Physical Exam   Constitutional: She is oriented to person, place, and time  She appears well-developed and well-nourished  HENT:   Head: Normocephalic and atraumatic  Right Ear: External ear normal  Tympanic membrane is not erythematous and not bulging  Left Ear: External ear normal  Tympanic membrane is not erythematous and not bulging  Nose: Nose normal    Mouth/Throat: Oropharynx is clear and moist and mucous membranes are normal  No oral lesions  No oropharyngeal exudate  Eyes: Conjunctivae and EOM are normal  Right eye exhibits no discharge  Left eye exhibits no discharge  No scleral icterus  Neck: Normal range of motion  Neck supple  No thyromegaly present  Cardiovascular: Normal rate, regular rhythm and normal heart sounds  Exam reveals no gallop and no friction rub  No murmur heard  Pulmonary/Chest: Effort normal  No respiratory distress  She has no wheezes  She has no rales  She exhibits no tenderness  Abdominal: Soft  Bowel sounds are normal  She exhibits no distension and no mass  There is no tenderness  There is no rebound and no guarding  Musculoskeletal: Normal range of motion  She exhibits no edema, tenderness or deformity  Lymphadenopathy:     She has no cervical adenopathy  Neurological: She is alert and oriented to person, place, and time  She has normal reflexes  No cranial nerve deficit  She exhibits normal muscle tone  Coordination normal    Skin: Skin is warm and dry  No rash noted  No erythema  No pallor  Psychiatric: She has a normal mood and affect  Her behavior is normal    Vitals reviewed

## 2019-11-26 NOTE — PROGRESS NOTES
BMI Counseling: Body mass index is 31 28 kg/m²  The BMI is above normal  Nutrition recommendations include reducing portion sizes  BMI Counseling: Body mass index is 31 28 kg/m²  The BMI is above normal  Nutrition recommendations include reducing portion sizes

## 2019-12-02 DIAGNOSIS — D50.9 IRON DEFICIENCY ANEMIA, UNSPECIFIED IRON DEFICIENCY ANEMIA TYPE: Primary | ICD-10-CM

## 2019-12-02 RX ORDER — FERROUS SULFATE TAB EC 324 MG (65 MG FE EQUIVALENT) 324 (65 FE) MG
324 TABLET DELAYED RESPONSE ORAL
Qty: 90 TABLET | Refills: 1 | Status: SHIPPED | OUTPATIENT
Start: 2019-12-02 | End: 2022-04-21 | Stop reason: ALTCHOICE

## 2020-01-06 ENCOUNTER — ANNUAL EXAM (OUTPATIENT)
Dept: OBGYN CLINIC | Facility: MEDICAL CENTER | Age: 50
End: 2020-01-06
Payer: COMMERCIAL

## 2020-01-06 VITALS — SYSTOLIC BLOOD PRESSURE: 120 MMHG | DIASTOLIC BLOOD PRESSURE: 66 MMHG | BODY MASS INDEX: 31.73 KG/M2 | WEIGHT: 172.4 LBS

## 2020-01-06 DIAGNOSIS — N95.0 PMB (POSTMENOPAUSAL BLEEDING): ICD-10-CM

## 2020-01-06 DIAGNOSIS — Z01.419 ENCOUNTER FOR ANNUAL ROUTINE GYNECOLOGICAL EXAMINATION: Primary | ICD-10-CM

## 2020-01-06 DIAGNOSIS — Z12.11 SCREENING FOR COLON CANCER: ICD-10-CM

## 2020-01-06 DIAGNOSIS — Z12.31 ENCOUNTER FOR SCREENING MAMMOGRAM FOR MALIGNANT NEOPLASM OF BREAST: ICD-10-CM

## 2020-01-06 PROCEDURE — S0612 ANNUAL GYNECOLOGICAL EXAMINA: HCPCS | Performed by: OBSTETRICS & GYNECOLOGY

## 2020-01-06 NOTE — PROGRESS NOTES
ASSESSMENT & PLAN: Ed Cuadra is a 44 y o  [id number] with normal gynecologic exam     1   Routine well woman exam done today  2  Pap and HPV:  The patient's last pap and hpv was 2017  It was normal     Pap with cotesting was not done today  Current ASCCP Guidelines reviewed  3   Mammogram ordered  4  Colorectal cancer screening was ordered  5  The following were reviewed in today's visit: breast self exam, mammography screening ordered, menopause, exercise and healthy diet  6  One episode of vaginal spotting for one day - no lesions seen , US ordered aware if thick lining will recommend EMB     CC:  Annual Gynecologic Examination    HPI: Ed Cuadra is a 44 y o  [id number] who presents for annual gynecologic examination  She has the following concerns:  One episode of very light spotting noted on Jan 1   Has been over one year without a cycle, denies having had intercourse at that time     Health Maintenance:    She wears her seatbelt routinely  She does perform regular monthly self breast exams  She feels safe at home  History reviewed  No pertinent past medical history  Past Surgical History:   Procedure Laterality Date    LAPAROSCOPY         Past OB/Gyn History:        Family History   Problem Relation Age of Onset    Cancer Mother 48        Sarcoma    Uterine cancer Mother        Social History:  Social History     Socioeconomic History    Marital status: /Civil Union     Spouse name: Not on file    Number of children: Not on file    Years of education: Not on file    Highest education level: Not on file   Occupational History    Not on file   Social Needs    Financial resource strain: Not on file    Food insecurity:     Worry: Not on file     Inability: Not on file    Transportation needs:     Medical: Not on file     Non-medical: Not on file   Tobacco Use    Smoking status: Never Smoker    Smokeless tobacco: Never Used   Substance and Sexual Activity    Alcohol use:  No  Drug use: No    Sexual activity: Yes     Partners: Male     Birth control/protection: Post-menopausal   Lifestyle    Physical activity:     Days per week: Not on file     Minutes per session: Not on file    Stress: Not on file   Relationships    Social connections:     Talks on phone: Not on file     Gets together: Not on file     Attends Catholic service: Not on file     Active member of club or organization: Not on file     Attends meetings of clubs or organizations: Not on file     Relationship status: Not on file    Intimate partner violence:     Fear of current or ex partner: Not on file     Emotionally abused: Not on file     Physically abused: Not on file     Forced sexual activity: Not on file   Other Topics Concern    Not on file   Social History Narrative    Not on file       No Known Allergies    Current Outpatient Medications:     betamethasone dipropionate (DIPROSONE) 0 05 % cream, Apply topically 2 (two) times a day, Disp: 45 g, Rfl: 2    ferrous sulfate 324 (65 Fe) mg, Take 1 tablet (324 mg total) by mouth daily before breakfast, Disp: 90 tablet, Rfl: 1    levothyroxine 75 mcg tablet, TAKE 1 TABLET BY MOUTH EVERY DAY, Disp: 30 tablet, Rfl: 8    meloxicam (MOBIC) 15 mg tablet, Take 1 tablet (15 mg total) by mouth daily As needed for pain, Disp: 30 tablet, Rfl: 1      Review of Systems  Constitutional :no fever, feels well, no tiredness, no recent weight gain or loss  ENT: no ear ache, no loss of hearing, no nosebleeds or nasal discharge, no sore throat or hoarseness  Cardiovascular: no complaints of slow or fast heart beat, no chest pain, no palpitations, no leg claudication or lower extremity edema    Respiratory: no complaints of shortness of shortness of breath, no MUNIZ  Breasts:no complaints of breast pain, breast lump, or nipple discharge  Gastrointestinal: no complaints of abdominal pain, constipation, nausea, vomiting, or diarrhea or bloody stools  Genitourinary :as noted in HPI   Musculoskeletal: no complaints of arthralgia, no myalgia, no joint swelling or stiffness, no limb pain or swelling  Integumentary: no complaints of skin rash or lesion, itching or dry skin  Neurological: no complaints of headache, no confusion, no numbness or tingling, no dizziness or fainting    Objective      /66   Wt 78 2 kg (172 lb 6 4 oz)   LMP  (LMP Unknown)   BMI 31 73 kg/m²     General:   appears stated age, cooperative, alert normal mood and affect   Heart: regular rate and rhythm, S1, S2 normal, no murmur, click, rub or gallop   Lungs: clear to auscultation bilaterally   Breasts: normal appearance, no masses or tenderness   Abdomen: soft, non-tender, without masses or organomegaly   Vulva: normal   Vagina: normal vagina, no discharge, exudate, lesion, or erythema   Urethra: normal   Cervix: Normal, no discharge  Nontender  Uterus: normal size, contour, position, consistency, mobility, non-tender   Adnexa: no mass, fullness, tenderness   Lymphatic palpation of lymph nodes in neck, axilla, groin and/or other locations: no lymphadenopathy or masses noted   Skin normal skin turgor and no rashes     Psychiatric orientation to person, place, and time: normal  mood and affect: normal

## 2020-03-16 ENCOUNTER — HOSPITAL ENCOUNTER (OUTPATIENT)
Dept: ULTRASOUND IMAGING | Facility: MEDICAL CENTER | Age: 50
Discharge: HOME | End: 2020-03-16
Payer: COMMERCIAL

## 2020-03-16 DIAGNOSIS — N95.0 PMB (POSTMENOPAUSAL BLEEDING): ICD-10-CM

## 2020-03-16 PROCEDURE — 76856 US EXAM PELVIC COMPLETE: CPT

## 2020-03-16 PROCEDURE — 76830 TRANSVAGINAL US NON-OB: CPT

## 2020-03-25 NOTE — RESULT ENCOUNTER NOTE
Please inform patient of normal results of US / lining is 3 mm and no mass seen , if has spotting again will need biopsy thanks

## 2020-04-27 ENCOUNTER — TELEMEDICINE (OUTPATIENT)
Dept: GASTROENTEROLOGY | Facility: MEDICAL CENTER | Age: 50
End: 2020-04-27
Payer: COMMERCIAL

## 2020-04-27 ENCOUNTER — TELEPHONE (OUTPATIENT)
Dept: GASTROENTEROLOGY | Facility: MEDICAL CENTER | Age: 50
End: 2020-04-27

## 2020-04-27 DIAGNOSIS — Z12.11 SCREEN FOR COLON CANCER: ICD-10-CM

## 2020-04-27 DIAGNOSIS — K21.9 GASTROESOPHAGEAL REFLUX DISEASE, ESOPHAGITIS PRESENCE NOT SPECIFIED: Primary | ICD-10-CM

## 2020-04-27 PROCEDURE — 99214 OFFICE O/P EST MOD 30 MIN: CPT | Performed by: INTERNAL MEDICINE

## 2020-04-27 RX ORDER — OMEPRAZOLE 40 MG/1
40 CAPSULE, DELAYED RELEASE ORAL
Qty: 30 CAPSULE | Refills: 3 | Status: SHIPPED | OUTPATIENT
Start: 2020-04-27 | End: 2020-06-19

## 2020-05-11 ENCOUNTER — HOSPITAL ENCOUNTER (OUTPATIENT)
Dept: MAMMOGRAPHY | Facility: MEDICAL CENTER | Age: 50
Discharge: HOME | End: 2020-05-11
Payer: COMMERCIAL

## 2020-05-11 VITALS — HEIGHT: 62 IN | BODY MASS INDEX: 31.65 KG/M2 | WEIGHT: 172 LBS

## 2020-05-11 DIAGNOSIS — Z12.31 ENCOUNTER FOR SCREENING MAMMOGRAM FOR MALIGNANT NEOPLASM OF BREAST: ICD-10-CM

## 2020-05-11 PROCEDURE — 77063 BREAST TOMOSYNTHESIS BI: CPT

## 2020-05-11 PROCEDURE — 77067 SCR MAMMO BI INCL CAD: CPT

## 2020-06-08 ENCOUNTER — PREP FOR PROCEDURE (OUTPATIENT)
Dept: GASTROENTEROLOGY | Facility: MEDICAL CENTER | Age: 50
End: 2020-06-08

## 2020-06-08 DIAGNOSIS — Z20.822 ENCOUNTER FOR LABORATORY TESTING FOR COVID-19 VIRUS: Primary | ICD-10-CM

## 2020-06-10 ENCOUNTER — ANESTHESIA EVENT (OUTPATIENT)
Dept: GASTROENTEROLOGY | Facility: MEDICAL CENTER | Age: 50
End: 2020-06-10

## 2020-06-10 ENCOUNTER — TELEPHONE (OUTPATIENT)
Dept: GASTROENTEROLOGY | Facility: CLINIC | Age: 50
End: 2020-06-10

## 2020-06-10 DIAGNOSIS — Z20.822 ENCOUNTER FOR LABORATORY TESTING FOR COVID-19 VIRUS: ICD-10-CM

## 2020-06-10 PROCEDURE — U0003 INFECTIOUS AGENT DETECTION BY NUCLEIC ACID (DNA OR RNA); SEVERE ACUTE RESPIRATORY SYNDROME CORONAVIRUS 2 (SARS-COV-2) (CORONAVIRUS DISEASE [COVID-19]), AMPLIFIED PROBE TECHNIQUE, MAKING USE OF HIGH THROUGHPUT TECHNOLOGIES AS DESCRIBED BY CMS-2020-01-R: HCPCS

## 2020-06-11 LAB — SARS-COV-2 RNA SPEC QL NAA+PROBE: NOT DETECTED

## 2020-06-15 ENCOUNTER — TELEPHONE (OUTPATIENT)
Dept: GASTROENTEROLOGY | Facility: AMBULARY SURGERY CENTER | Age: 50
End: 2020-06-15

## 2020-06-15 NOTE — TELEPHONE ENCOUNTER
Patients GI provider:  Dr Bishnu Lee    Number to return call: [phone]    Reason for call: Patient's daughter Nae Davila called stated mom got confused with the prep instructions and would like to speak with someone about it       Scheduled procedure/appointment date if applicable: Apt/procedure 6/16/20

## 2020-06-16 ENCOUNTER — HOSPITAL ENCOUNTER (OUTPATIENT)
Dept: GASTROENTEROLOGY | Facility: MEDICAL CENTER | Age: 50
Setting detail: OUTPATIENT SURGERY
Discharge: HOME | End: 2020-06-16
Attending: INTERNAL MEDICINE
Payer: COMMERCIAL

## 2020-06-16 ENCOUNTER — ANESTHESIA (OUTPATIENT)
Dept: GASTROENTEROLOGY | Facility: MEDICAL CENTER | Age: 50
End: 2020-06-16

## 2020-06-16 VITALS
SYSTOLIC BLOOD PRESSURE: 130 MMHG | DIASTOLIC BLOOD PRESSURE: 76 MMHG | HEIGHT: 62 IN | BODY MASS INDEX: 29.81 KG/M2 | TEMPERATURE: 97.9 F | HEART RATE: 104 BPM | RESPIRATION RATE: 18 BRPM | WEIGHT: 162 LBS | OXYGEN SATURATION: 100 %

## 2020-06-16 DIAGNOSIS — Z12.11 SCREEN FOR COLON CANCER: ICD-10-CM

## 2020-06-16 DIAGNOSIS — K21.9 GASTROESOPHAGEAL REFLUX DISEASE, ESOPHAGITIS PRESENCE NOT SPECIFIED: ICD-10-CM

## 2020-06-16 PROCEDURE — 88342 IMHCHEM/IMCYTCHM 1ST ANTB: CPT | Performed by: PATHOLOGY

## 2020-06-16 PROCEDURE — NC001 PR NO CHARGE: Performed by: INTERNAL MEDICINE

## 2020-06-16 PROCEDURE — 45385 COLONOSCOPY W/LESION REMOVAL: CPT | Performed by: INTERNAL MEDICINE

## 2020-06-16 PROCEDURE — 88305 TISSUE EXAM BY PATHOLOGIST: CPT | Performed by: PATHOLOGY

## 2020-06-16 PROCEDURE — 43239 EGD BIOPSY SINGLE/MULTIPLE: CPT | Performed by: INTERNAL MEDICINE

## 2020-06-16 RX ORDER — PROPOFOL 10 MG/ML
INJECTION, EMULSION INTRAVENOUS AS NEEDED
Status: DISCONTINUED | OUTPATIENT
Start: 2020-06-16 | End: 2020-06-16 | Stop reason: SURG

## 2020-06-16 RX ORDER — PROPOFOL 10 MG/ML
INJECTION, EMULSION INTRAVENOUS CONTINUOUS PRN
Status: DISCONTINUED | OUTPATIENT
Start: 2020-06-16 | End: 2020-06-16 | Stop reason: SURG

## 2020-06-16 RX ORDER — LIDOCAINE HYDROCHLORIDE 20 MG/ML
INJECTION, SOLUTION EPIDURAL; INFILTRATION; INTRACAUDAL; PERINEURAL AS NEEDED
Status: DISCONTINUED | OUTPATIENT
Start: 2020-06-16 | End: 2020-06-16 | Stop reason: SURG

## 2020-06-16 RX ORDER — SODIUM CHLORIDE 9 MG/ML
125 INJECTION, SOLUTION INTRAVENOUS CONTINUOUS
Status: DISCONTINUED | OUTPATIENT
Start: 2020-06-16 | End: 2020-06-16

## 2020-06-16 RX ADMIN — PROPOFOL 120 MG: 10 INJECTION, EMULSION INTRAVENOUS at 11:03

## 2020-06-16 RX ADMIN — SODIUM CHLORIDE 125 ML/HR: 0.9 INJECTION, SOLUTION INTRAVENOUS at 10:56

## 2020-06-16 RX ADMIN — LIDOCAINE HYDROCHLORIDE 5 ML: 20 INJECTION, SOLUTION EPIDURAL; INFILTRATION; INTRACAUDAL at 11:03

## 2020-06-16 RX ADMIN — PROPOFOL 180 MCG/KG/MIN: 10 INJECTION, EMULSION INTRAVENOUS at 11:03

## 2020-06-19 DIAGNOSIS — K21.9 GASTROESOPHAGEAL REFLUX DISEASE, ESOPHAGITIS PRESENCE NOT SPECIFIED: ICD-10-CM

## 2020-06-19 RX ORDER — OMEPRAZOLE 40 MG/1
CAPSULE, DELAYED RELEASE ORAL
Qty: 30 CAPSULE | Refills: 3 | Status: SHIPPED | OUTPATIENT
Start: 2020-06-19

## 2020-06-20 DIAGNOSIS — E03.9 HYPOTHYROIDISM, UNSPECIFIED TYPE: ICD-10-CM

## 2020-06-21 RX ORDER — LEVOTHYROXINE SODIUM 0.07 MG/1
TABLET ORAL
Qty: 30 TABLET | Refills: 8 | Status: SHIPPED | OUTPATIENT
Start: 2020-06-21 | End: 2020-11-03 | Stop reason: SDUPTHER

## 2020-11-03 ENCOUNTER — OFFICE VISIT (OUTPATIENT)
Dept: FAMILY MEDICINE CLINIC | Facility: CLINIC | Age: 50
End: 2020-11-03
Payer: COMMERCIAL

## 2020-11-03 VITALS
DIASTOLIC BLOOD PRESSURE: 62 MMHG | HEIGHT: 61 IN | BODY MASS INDEX: 30.96 KG/M2 | HEART RATE: 75 BPM | SYSTOLIC BLOOD PRESSURE: 110 MMHG | RESPIRATION RATE: 18 BRPM | WEIGHT: 164 LBS | OXYGEN SATURATION: 99 % | TEMPERATURE: 97.8 F

## 2020-11-03 DIAGNOSIS — Z23 NEED FOR INFLUENZA VACCINATION: ICD-10-CM

## 2020-11-03 DIAGNOSIS — E03.9 HYPOTHYROIDISM, UNSPECIFIED TYPE: Primary | ICD-10-CM

## 2020-11-03 LAB
ALBUMIN SERPL BCP-MCNC: 3.8 G/DL (ref 3.5–5)
ALP SERPL-CCNC: 90 U/L (ref 46–116)
ALT SERPL W P-5'-P-CCNC: 37 U/L (ref 12–78)
ANION GAP SERPL CALCULATED.3IONS-SCNC: 4 MMOL/L (ref 4–13)
AST SERPL W P-5'-P-CCNC: 23 U/L (ref 5–45)
BASOPHILS # BLD AUTO: 0.04 THOUSANDS/ΜL (ref 0–0.1)
BASOPHILS NFR BLD AUTO: 1 % (ref 0–1)
BILIRUB SERPL-MCNC: 0.3 MG/DL (ref 0.2–1)
BUN SERPL-MCNC: 15 MG/DL (ref 5–25)
CALCIUM SERPL-MCNC: 9.4 MG/DL (ref 8.3–10.1)
CHLORIDE SERPL-SCNC: 106 MMOL/L (ref 100–108)
CHOLEST SERPL-MCNC: 171 MG/DL (ref 50–200)
CO2 SERPL-SCNC: 28 MMOL/L (ref 21–32)
CREAT SERPL-MCNC: 0.76 MG/DL (ref 0.6–1.3)
EOSINOPHIL # BLD AUTO: 0.09 THOUSAND/ΜL (ref 0–0.61)
EOSINOPHIL NFR BLD AUTO: 1 % (ref 0–6)
ERYTHROCYTE [DISTWIDTH] IN BLOOD BY AUTOMATED COUNT: 16.8 % (ref 11.6–15.1)
GFR SERPL CREATININE-BSD FRML MDRD: 90 ML/MIN/1.73SQ M
GLUCOSE SERPL-MCNC: 97 MG/DL (ref 65–140)
HCT VFR BLD AUTO: 37.4 % (ref 34.8–46.1)
HDLC SERPL-MCNC: 76 MG/DL
HGB BLD-MCNC: 11.3 G/DL (ref 11.5–15.4)
IMM GRANULOCYTES # BLD AUTO: 0.02 THOUSAND/UL (ref 0–0.2)
IMM GRANULOCYTES NFR BLD AUTO: 0 % (ref 0–2)
LDLC SERPL CALC-MCNC: 71 MG/DL (ref 0–100)
LYMPHOCYTES # BLD AUTO: 2.37 THOUSANDS/ΜL (ref 0.6–4.47)
LYMPHOCYTES NFR BLD AUTO: 36 % (ref 14–44)
MCH RBC QN AUTO: 20.1 PG (ref 26.8–34.3)
MCHC RBC AUTO-ENTMCNC: 30.2 G/DL (ref 31.4–37.4)
MCV RBC AUTO: 67 FL (ref 82–98)
MONOCYTES # BLD AUTO: 0.48 THOUSAND/ΜL (ref 0.17–1.22)
MONOCYTES NFR BLD AUTO: 7 % (ref 4–12)
NEUTROPHILS # BLD AUTO: 3.57 THOUSANDS/ΜL (ref 1.85–7.62)
NEUTS SEG NFR BLD AUTO: 55 % (ref 43–75)
NRBC BLD AUTO-RTO: 0 /100 WBCS
PLATELET # BLD AUTO: 230 THOUSANDS/UL (ref 149–390)
PMV BLD AUTO: 11.1 FL (ref 8.9–12.7)
POTASSIUM SERPL-SCNC: 3.9 MMOL/L (ref 3.5–5.3)
PROT SERPL-MCNC: 8.3 G/DL (ref 6.4–8.2)
RBC # BLD AUTO: 5.61 MILLION/UL (ref 3.81–5.12)
SODIUM SERPL-SCNC: 138 MMOL/L (ref 136–145)
TRIGL SERPL-MCNC: 122 MG/DL
TSH SERPL DL<=0.05 MIU/L-ACNC: 0.74 UIU/ML (ref 0.36–3.74)
WBC # BLD AUTO: 6.57 THOUSAND/UL (ref 4.31–10.16)

## 2020-11-03 PROCEDURE — 90471 IMMUNIZATION ADMIN: CPT

## 2020-11-03 PROCEDURE — 80053 COMPREHEN METABOLIC PANEL: CPT | Performed by: FAMILY MEDICINE

## 2020-11-03 PROCEDURE — 90682 RIV4 VACC RECOMBINANT DNA IM: CPT

## 2020-11-03 PROCEDURE — 99213 OFFICE O/P EST LOW 20 MIN: CPT | Performed by: FAMILY MEDICINE

## 2020-11-03 PROCEDURE — 85025 COMPLETE CBC W/AUTO DIFF WBC: CPT | Performed by: FAMILY MEDICINE

## 2020-11-03 PROCEDURE — 84443 ASSAY THYROID STIM HORMONE: CPT | Performed by: FAMILY MEDICINE

## 2020-11-03 PROCEDURE — 3008F BODY MASS INDEX DOCD: CPT | Performed by: FAMILY MEDICINE

## 2020-11-03 PROCEDURE — 36415 COLL VENOUS BLD VENIPUNCTURE: CPT | Performed by: FAMILY MEDICINE

## 2020-11-03 PROCEDURE — 3725F SCREEN DEPRESSION PERFORMED: CPT | Performed by: FAMILY MEDICINE

## 2020-11-03 PROCEDURE — 80061 LIPID PANEL: CPT | Performed by: FAMILY MEDICINE

## 2020-11-03 PROCEDURE — 1036F TOBACCO NON-USER: CPT | Performed by: FAMILY MEDICINE

## 2020-11-03 RX ORDER — LEVOTHYROXINE SODIUM 0.07 MG/1
75 TABLET ORAL DAILY
Qty: 30 TABLET | Refills: 8 | Status: SHIPPED | OUTPATIENT
Start: 2020-11-03 | End: 2021-11-23

## 2020-12-03 ENCOUNTER — TELEPHONE (OUTPATIENT)
Dept: FAMILY MEDICINE CLINIC | Facility: CLINIC | Age: 50
End: 2020-12-03

## 2021-02-11 ENCOUNTER — ANNUAL EXAM (OUTPATIENT)
Dept: OBGYN CLINIC | Facility: CLINIC | Age: 51
End: 2021-02-11
Payer: COMMERCIAL

## 2021-02-11 VITALS — SYSTOLIC BLOOD PRESSURE: 130 MMHG | WEIGHT: 166.3 LBS | BODY MASS INDEX: 31.42 KG/M2 | DIASTOLIC BLOOD PRESSURE: 75 MMHG

## 2021-02-11 DIAGNOSIS — Z12.31 ENCOUNTER FOR SCREENING MAMMOGRAM FOR MALIGNANT NEOPLASM OF BREAST: ICD-10-CM

## 2021-02-11 DIAGNOSIS — Z01.419 ENCOUNTER FOR ANNUAL ROUTINE GYNECOLOGICAL EXAMINATION: Primary | ICD-10-CM

## 2021-02-11 PROCEDURE — S0612 ANNUAL GYNECOLOGICAL EXAMINA: HCPCS | Performed by: OBSTETRICS & GYNECOLOGY

## 2021-02-11 NOTE — PROGRESS NOTES
ASSESSMENT & PLAN: Cori Treadwell is a 44 y o  [id number] with normal gynecologic exam     1   Routine well woman exam done today  2  Pap and HPV:  The patient's last pap and hpv was   It was normal     Pap with cotesting was not done today  Current ASCCP Guidelines reviewed  3   Mammogram ordered  4  Colorectal cancer screening was not ordered  5  The following were reviewed in today's visit: breast self exam, mammography screening ordered, menopause, exercise and healthy diet      CC:  Annual Gynecologic Examination    HPI: Cori Treadwell is a 44 y o  [id number] who presents for annual gynecologic examination  She has the following concerns:  None     Health Maintenance:    She wears her seatbelt routinely  She does perform regular monthly self breast exams  She feels safe at home  Pap: 2017  Last mammogram:   Last colonoscopy:     Past Medical History:   Diagnosis Date    GERD (gastroesophageal reflux disease)     Hypothyroid        Past Surgical History:   Procedure Laterality Date    LAPAROSCOPY      TUBAL LIGATION         Past OB/Gyn History:  OB History        5    Para   4    Term   3       1    AB   1    Living   3       SAB   1    TAB        Ectopic        Multiple        Live Births   3               Pt does not have menstrual issues      History of sexually transmitted infection: No   History of abnormal pap smears: No      Family History   Problem Relation Age of Onset   Eva Clutter Cancer Mother 48        Sarcoma    Uterine cancer Mother 64    No Known Problems Father     No Known Problems Daughter     No Known Problems Maternal Grandmother     No Known Problems Maternal Grandfather     No Known Problems Paternal Grandmother     No Known Problems Paternal Grandfather     No Known Problems Maternal Aunt     No Known Problems Paternal Aunt     No Known Problems Paternal Aunt     No Known Problems Paternal Aunt     No Known Problems Paternal Aunt        Social History:  Social History     Socioeconomic History    Marital status: /Civil Union     Spouse name: Not on file    Number of children: Not on file    Years of education: Not on file    Highest education level: Not on file   Occupational History    Not on file   Social Needs    Financial resource strain: Not on file    Food insecurity     Worry: Not on file     Inability: Not on file    Transportation needs     Medical: Not on file     Non-medical: Not on file   Tobacco Use    Smoking status: Never Smoker    Smokeless tobacco: Never Used   Substance and Sexual Activity    Alcohol use: No    Drug use: No    Sexual activity: Yes     Partners: Male     Birth control/protection: Post-menopausal   Lifestyle    Physical activity     Days per week: Not on file     Minutes per session: Not on file    Stress: Not on file   Relationships    Social connections     Talks on phone: Not on file     Gets together: Not on file     Attends Anabaptist service: Not on file     Active member of club or organization: Not on file     Attends meetings of clubs or organizations: Not on file     Relationship status: Not on file    Intimate partner violence     Fear of current or ex partner: Not on file     Emotionally abused: Not on file     Physically abused: Not on file     Forced sexual activity: Not on file   Other Topics Concern    Not on file   Social History Narrative    Daily caffeine use- 1 cup of tea       No Known Allergies    Current Outpatient Medications:     levothyroxine 75 mcg tablet, Take 1 tablet (75 mcg total) by mouth daily, Disp: 30 tablet, Rfl: 8    omeprazole (PriLOSEC) 40 MG capsule, TAKE 1 CAPSULE BY MOUTH EVERY DAY BEFORE BREAKFAST, Disp: 30 capsule, Rfl: 3    betamethasone dipropionate (DIPROSONE) 0 05 % cream, Apply topically 2 (two) times a day (Patient not taking: Reported on 12/11/2020), Disp: 45 g, Rfl: 2    ferrous sulfate 324 (65 Fe) mg, Take 1 tablet (324 mg total) by mouth daily before breakfast (Patient not taking: Reported on 12/11/2020), Disp: 90 tablet, Rfl: 1    meloxicam (MOBIC) 15 mg tablet, Take 1 tablet (15 mg total) by mouth daily As needed for pain (Patient not taking: Reported on 12/11/2020), Disp: 30 tablet, Rfl: 1      Review of Systems  Constitutional :no fever, feels well, no tiredness, no recent weight gain or loss  ENT: no ear ache, no loss of hearing, no nosebleeds or nasal discharge, no sore throat or hoarseness  Cardiovascular: no complaints of slow or fast heart beat, no chest pain, no palpitations, no leg claudication or lower extremity edema  Respiratory: no complaints of shortness of shortness of breath, no MUNIZ  Breasts:no complaints of breast pain, breast lump, or nipple discharge  Gastrointestinal: no complaints of abdominal pain, constipation, nausea, vomiting, or diarrhea or bloody stools  Genitourinary : no complaints of dysuria, incontinence, pelvic pain, no dysmenorrhea, vaginal discharge or abnormal vaginal bleeding and as noted in HPI  Musculoskeletal: no complaints of arthralgia, no myalgia, no joint swelling or stiffness, no limb pain or swelling  Integumentary: no complaints of skin rash or lesion, itching or dry skin  Neurological: no complaints of headache, no confusion, no numbness or tingling, no dizziness or fainting    Objective      /75   Wt 75 4 kg (166 lb 4 8 oz)   LMP  (LMP Unknown)   BMI 31 42 kg/m²     General:   appears stated age, cooperative, alert normal mood and affect   Lungs: Unlabored breathing    Breasts: normal appearance, no masses or tenderness   Abdomen: soft, non-tender, without masses or organomegaly   Vulva: normal   Vagina: normal vagina, no discharge, exudate, lesion, or erythema   Urethra: normal   Cervix: Normal, no discharge  Nontender     Uterus: normal size, contour, position, consistency, mobility, non-tender   Adnexa: no mass, fullness, tenderness   Lymphatic palpation of lymph nodes in neck, axilla, groin and/or other locations: no lymphadenopathy or masses noted   Skin normal skin turgor and no rashes     Psychiatric orientation to person, place, and time: normal  mood and affect: normal

## 2021-02-22 ENCOUNTER — TELEPHONE (OUTPATIENT)
Dept: FAMILY MEDICINE CLINIC | Facility: CLINIC | Age: 51
End: 2021-02-22

## 2021-02-22 NOTE — TELEPHONE ENCOUNTER
Pt would like to know if you recommend any providers to treat varicose veins  Please advise, thank you

## 2021-02-25 NOTE — TELEPHONE ENCOUNTER
Pt aware  Pt given number to [facility]  Pt will make an appt

## 2021-07-06 ENCOUNTER — HOSPITAL ENCOUNTER (OUTPATIENT)
Dept: MAMMOGRAPHY | Facility: MEDICAL CENTER | Age: 51
Discharge: HOME | End: 2021-07-06
Payer: COMMERCIAL

## 2021-07-06 VITALS — BODY MASS INDEX: 31.34 KG/M2 | WEIGHT: 166 LBS | HEIGHT: 61 IN

## 2021-07-06 DIAGNOSIS — Z12.31 ENCOUNTER FOR SCREENING MAMMOGRAM FOR MALIGNANT NEOPLASM OF BREAST: ICD-10-CM

## 2021-07-06 PROCEDURE — 77067 SCR MAMMO BI INCL CAD: CPT

## 2021-07-06 PROCEDURE — 77063 BREAST TOMOSYNTHESIS BI: CPT

## 2021-10-13 ENCOUNTER — OFFICE VISIT (OUTPATIENT)
Dept: FAMILY MEDICINE CLINIC | Facility: CLINIC | Age: 51
End: 2021-10-13
Payer: COMMERCIAL

## 2021-10-13 VITALS
TEMPERATURE: 97.8 F | OXYGEN SATURATION: 99 % | HEIGHT: 61 IN | HEART RATE: 97 BPM | WEIGHT: 157.8 LBS | BODY MASS INDEX: 29.79 KG/M2 | DIASTOLIC BLOOD PRESSURE: 78 MMHG | SYSTOLIC BLOOD PRESSURE: 108 MMHG

## 2021-10-13 DIAGNOSIS — J01.10 ACUTE NON-RECURRENT FRONTAL SINUSITIS: Primary | ICD-10-CM

## 2021-10-13 PROCEDURE — U0005 INFEC AGEN DETEC AMPLI PROBE: HCPCS | Performed by: FAMILY MEDICINE

## 2021-10-13 PROCEDURE — 1036F TOBACCO NON-USER: CPT | Performed by: FAMILY MEDICINE

## 2021-10-13 PROCEDURE — U0003 INFECTIOUS AGENT DETECTION BY NUCLEIC ACID (DNA OR RNA); SEVERE ACUTE RESPIRATORY SYNDROME CORONAVIRUS 2 (SARS-COV-2) (CORONAVIRUS DISEASE [COVID-19]), AMPLIFIED PROBE TECHNIQUE, MAKING USE OF HIGH THROUGHPUT TECHNOLOGIES AS DESCRIBED BY CMS-2020-01-R: HCPCS | Performed by: FAMILY MEDICINE

## 2021-10-13 PROCEDURE — 3725F SCREEN DEPRESSION PERFORMED: CPT | Performed by: FAMILY MEDICINE

## 2021-10-13 PROCEDURE — 99213 OFFICE O/P EST LOW 20 MIN: CPT | Performed by: FAMILY MEDICINE

## 2021-10-13 PROCEDURE — 3008F BODY MASS INDEX DOCD: CPT | Performed by: FAMILY MEDICINE

## 2021-10-13 RX ORDER — AZELASTINE 1 MG/ML
2 SPRAY, METERED NASAL 2 TIMES DAILY
Qty: 30 ML | Refills: 1 | Status: SHIPPED | OUTPATIENT
Start: 2021-10-13 | End: 2021-11-12

## 2021-10-13 RX ORDER — AMOXICILLIN AND CLAVULANATE POTASSIUM 875; 125 MG/1; MG/1
1 TABLET, FILM COATED ORAL EVERY 12 HOURS SCHEDULED
Qty: 14 TABLET | Refills: 0 | Status: SHIPPED | OUTPATIENT
Start: 2021-10-13 | End: 2021-10-20

## 2021-10-14 LAB — SARS-COV-2 RNA RESP QL NAA+PROBE: NEGATIVE

## 2021-12-17 ENCOUNTER — OFFICE VISIT (OUTPATIENT)
Dept: FAMILY MEDICINE CLINIC | Facility: CLINIC | Age: 51
End: 2021-12-17
Payer: COMMERCIAL

## 2021-12-17 VITALS
SYSTOLIC BLOOD PRESSURE: 115 MMHG | HEART RATE: 73 BPM | HEIGHT: 63 IN | TEMPERATURE: 97.3 F | BODY MASS INDEX: 29.59 KG/M2 | OXYGEN SATURATION: 98 % | WEIGHT: 167 LBS | DIASTOLIC BLOOD PRESSURE: 75 MMHG

## 2021-12-17 DIAGNOSIS — B02.9 HERPES ZOSTER WITHOUT COMPLICATION: Primary | ICD-10-CM

## 2021-12-17 PROCEDURE — 1036F TOBACCO NON-USER: CPT | Performed by: FAMILY MEDICINE

## 2021-12-17 PROCEDURE — 3008F BODY MASS INDEX DOCD: CPT | Performed by: FAMILY MEDICINE

## 2021-12-17 PROCEDURE — 99213 OFFICE O/P EST LOW 20 MIN: CPT | Performed by: FAMILY MEDICINE

## 2021-12-17 RX ORDER — LIDOCAINE 50 MG/G
OINTMENT TOPICAL AS NEEDED
Qty: 35.44 G | Refills: 0 | Status: SHIPPED | OUTPATIENT
Start: 2021-12-17 | End: 2022-04-21 | Stop reason: ALTCHOICE

## 2021-12-17 RX ORDER — VALACYCLOVIR HYDROCHLORIDE 1 G/1
1000 TABLET, FILM COATED ORAL 3 TIMES DAILY
Qty: 21 TABLET | Refills: 0 | Status: SHIPPED | OUTPATIENT
Start: 2021-12-17 | End: 2021-12-24

## 2022-01-18 DIAGNOSIS — E03.9 HYPOTHYROIDISM, UNSPECIFIED TYPE: ICD-10-CM

## 2022-01-18 RX ORDER — LEVOTHYROXINE SODIUM 0.07 MG/1
TABLET ORAL
Qty: 30 TABLET | Refills: 0 | Status: SHIPPED | OUTPATIENT
Start: 2022-01-18 | End: 2022-02-15

## 2022-02-17 ENCOUNTER — ANNUAL EXAM (OUTPATIENT)
Dept: OBGYN CLINIC | Facility: CLINIC | Age: 52
End: 2022-02-17
Payer: COMMERCIAL

## 2022-02-17 VITALS — HEIGHT: 63 IN | WEIGHT: 166.8 LBS | BODY MASS INDEX: 29.55 KG/M2

## 2022-02-17 DIAGNOSIS — Z12.31 ENCOUNTER FOR SCREENING MAMMOGRAM FOR MALIGNANT NEOPLASM OF BREAST: ICD-10-CM

## 2022-02-17 DIAGNOSIS — Z01.419 ENCOUNTER FOR ANNUAL ROUTINE GYNECOLOGICAL EXAMINATION: Primary | ICD-10-CM

## 2022-02-17 PROCEDURE — S0612 ANNUAL GYNECOLOGICAL EXAMINA: HCPCS | Performed by: OBSTETRICS & GYNECOLOGY

## 2022-02-17 PROCEDURE — G0145 SCR C/V CYTO,THINLAYER,RESCR: HCPCS | Performed by: OBSTETRICS & GYNECOLOGY

## 2022-02-17 PROCEDURE — G0476 HPV COMBO ASSAY CA SCREEN: HCPCS | Performed by: OBSTETRICS & GYNECOLOGY

## 2022-02-17 NOTE — PROGRESS NOTES
ASSESSMENT & PLAN: Asad Orozco is a 46 y o  [id number] with normal gynecologic exam     1   Routine well woman exam done today  2  Pap and HPV:  The patient's last pap and hpv was   It was normal     Pap with cotesting was done today  Current ASCCP Guidelines reviewed  3   Mammogram ordered  4  Colorectal cancer screening was not ordered  - up to date   11  The following were reviewed in today's visit: breast self exam, mammography screening ordered, menopause, exercise and healthy diet      CC:  Annual Gynecologic Examination    HPI: Asad Orozco is a 46 y o  [id number] who presents for annual gynecologic examination  She has the following concerns:  Occasional vaginal itching     Health Maintenance:    She wears her seatbelt routinely  She does perform regular monthly self breast exams  She feels safe at home  Past Medical History:   Diagnosis Date    GERD (gastroesophageal reflux disease)     Hypothyroid        Past Surgical History:   Procedure Laterality Date    LAPAROSCOPY      TUBAL LIGATION         Past OB/Gyn History:  OB History        5    Para   4    Term   3       1    AB   1    Living   3       SAB   1    IAB        Ectopic        Multiple        Live Births   3               Pt does not have menstrual issues  History of sexually transmitted infection: No   History of abnormal pap smears: No      Patient is currently sexually active  heterosexual   The current method of family planning is post menopausal status      Family History   Problem Relation Age of Onset    Cancer Mother 48        Sarcoma    Uterine cancer Mother 64    No Known Problems Father     No Known Problems Daughter     No Known Problems Maternal Grandmother     No Known Problems Maternal Grandfather     No Known Problems Paternal Grandmother     No Known Problems Paternal Grandfather     No Known Problems Maternal Aunt     No Known Problems Paternal Aunt     No Known Problems Paternal Aunt  No Known Problems Paternal Aunt     No Known Problems Paternal Aunt     No Known Problems Son     No Known Problems Son        Social History:  Social History     Socioeconomic History    Marital status: /Civil Union     Spouse name: Not on file    Number of children: Not on file    Years of education: Not on file    Highest education level: Not on file   Occupational History    Not on file   Tobacco Use    Smoking status: Never Smoker    Smokeless tobacco: Never Used   Vaping Use    Vaping Use: Never used   Substance and Sexual Activity    Alcohol use: No    Drug use: No    Sexual activity: Yes     Partners: Male     Birth control/protection: Post-menopausal   Other Topics Concern    Not on file   Social History Narrative    Daily caffeine use- 1 cup of tea     Social Determinants of Health     Financial Resource Strain: Not on file   Food Insecurity: Not on file   Transportation Needs: Not on file   Physical Activity: Not on file   Stress: Not on file   Social Connections: Not on file   Intimate Partner Violence: Not on file   Housing Stability: Not on file       No Known Allergies    Current Outpatient Medications:     omeprazole (PriLOSEC) 40 MG capsule, TAKE 1 CAPSULE BY MOUTH EVERY DAY BEFORE BREAKFAST, Disp: 30 capsule, Rfl: 3    azelastine (ASTELIN) 0 1 % nasal spray, 2 sprays into each nostril 2 (two) times a day Use in each nostril as directed (Patient not taking: Reported on 12/17/2021 ), Disp: 30 mL, Rfl: 1    betamethasone dipropionate (DIPROSONE) 0 05 % cream, Apply topically 2 (two) times a day (Patient not taking: Reported on 12/11/2020), Disp: 45 g, Rfl: 2    ferrous sulfate 324 (65 Fe) mg, Take 1 tablet (324 mg total) by mouth daily before breakfast (Patient not taking: Reported on 12/17/2021 ), Disp: 90 tablet, Rfl: 1    levothyroxine 75 mcg tablet, TAKE 1 TABLET BY MOUTH EVERY DAY, Disp: 30 tablet, Rfl: 0    lidocaine (XYLOCAINE) 5 % ointment, Apply topically as needed for mild pain, Disp: 35 44 g, Rfl: 0    meloxicam (MOBIC) 15 mg tablet, Take 1 tablet (15 mg total) by mouth daily As needed for pain (Patient not taking: Reported on 12/11/2020), Disp: 30 tablet, Rfl: 1    valACYclovir (VALTREX) 1,000 mg tablet, Take 1 tablet (1,000 mg total) by mouth 3 (three) times a day for 7 days, Disp: 21 tablet, Rfl: 0      Review of Systems  Constitutional :no fever, feels well, no tiredness, no recent weight gain or loss  ENT: no ear ache, no loss of hearing, no nosebleeds or nasal discharge, no sore throat or hoarseness  Cardiovascular: no complaints of slow or fast heart beat, no chest pain, no palpitations, no leg claudication or lower extremity edema  Respiratory: no complaints of shortness of shortness of breath, no MUNIZ  Breasts:no complaints of breast pain, breast lump, or nipple discharge  Gastrointestinal: no complaints of abdominal pain, constipation, nausea, vomiting, or diarrhea or bloody stools  Genitourinary : no complaints of dysuria, incontinence, pelvic pain, no dysmenorrhea, vaginal discharge or abnormal vaginal bleeding and as noted in HPI  Musculoskeletal: no complaints of arthralgia, no myalgia, no joint swelling or stiffness, no limb pain or swelling  Integumentary: no complaints of skin rash or lesion, itching or dry skin  Neurological: no complaints of headache, no confusion, no numbness or tingling, no dizziness or fainting    Objective      Ht 5' 3" (1 6 m)   Wt 75 7 kg (166 lb 12 8 oz)   LMP  (LMP Unknown)   Breastfeeding No   BMI 29 55 kg/m²     General:   appears stated age, cooperative, alert normal mood and affect   Lungs: Unlabored breathing    Breasts: normal appearance, no masses or tenderness   Abdomen: soft, non-tender, without masses or organomegaly   Vulva: normal   Vagina: normal vagina, no discharge, exudate, lesion, or erythema   Urethra: normal   Cervix: Normal, no discharge  Nontender     Uterus: normal size, contour, position, consistency, mobility, non-tender   Adnexa: no mass, fullness, tenderness   Psychiatric orientation to person, place, and time: normal  mood and affect: normal

## 2022-02-22 LAB
HPV HR 12 DNA CVX QL NAA+PROBE: NEGATIVE
HPV16 DNA CVX QL NAA+PROBE: NEGATIVE
HPV18 DNA CVX QL NAA+PROBE: NEGATIVE

## 2022-02-24 LAB
LAB AP GYN PRIMARY INTERPRETATION: NORMAL
Lab: NORMAL

## 2022-03-17 DIAGNOSIS — E03.9 HYPOTHYROIDISM, UNSPECIFIED TYPE: ICD-10-CM

## 2022-03-17 RX ORDER — LEVOTHYROXINE SODIUM 0.07 MG/1
TABLET ORAL
Qty: 30 TABLET | Refills: 0 | Status: SHIPPED | OUTPATIENT
Start: 2022-03-17 | End: 2022-05-09 | Stop reason: SDUPTHER

## 2022-04-21 ENCOUNTER — OFFICE VISIT (OUTPATIENT)
Dept: FAMILY MEDICINE CLINIC | Facility: CLINIC | Age: 52
End: 2022-04-21
Payer: COMMERCIAL

## 2022-04-21 VITALS
BODY MASS INDEX: 30.07 KG/M2 | HEIGHT: 62 IN | WEIGHT: 163.4 LBS | DIASTOLIC BLOOD PRESSURE: 68 MMHG | HEART RATE: 74 BPM | TEMPERATURE: 97.4 F | OXYGEN SATURATION: 99 % | RESPIRATION RATE: 16 BRPM | SYSTOLIC BLOOD PRESSURE: 124 MMHG

## 2022-04-21 DIAGNOSIS — Z00.00 WELL ADULT EXAM: Primary | ICD-10-CM

## 2022-04-21 DIAGNOSIS — E03.9 HYPOTHYROIDISM, UNSPECIFIED TYPE: ICD-10-CM

## 2022-04-21 DIAGNOSIS — Z11.59 NEED FOR HEPATITIS C SCREENING TEST: ICD-10-CM

## 2022-04-21 DIAGNOSIS — Z11.4 SCREENING FOR HIV (HUMAN IMMUNODEFICIENCY VIRUS): ICD-10-CM

## 2022-04-21 PROCEDURE — 3725F SCREEN DEPRESSION PERFORMED: CPT | Performed by: FAMILY MEDICINE

## 2022-04-21 PROCEDURE — 99396 PREV VISIT EST AGE 40-64: CPT | Performed by: FAMILY MEDICINE

## 2022-04-21 PROCEDURE — 1036F TOBACCO NON-USER: CPT | Performed by: FAMILY MEDICINE

## 2022-04-21 PROCEDURE — 3008F BODY MASS INDEX DOCD: CPT | Performed by: FAMILY MEDICINE

## 2022-04-21 NOTE — PROGRESS NOTES
BMI Counseling: Body mass index is 29 89 kg/m²  The BMI is above normal  Nutrition recommendations include reducing portion sizes

## 2022-04-22 NOTE — PROGRESS NOTES
Assessment/Plan:  Recommend annual blood testing  Recommend follow-up with us yearly  Adjust thyroid medication if needed  Anticipatory guidance provided  1  Well adult exam  -     CBC and differential  -     Comprehensive metabolic panel  -     Lipid Panel with Direct LDL reflex  -     Hemoglobin A1C  -     UA (URINE) with reflex to Scope    2  Need for hepatitis C screening test  -     Hepatitis C Antibody ([facility]); Future    3  Screening for HIV (human immunodeficiency virus)  -     HIV 1/2 Antigen/Antibody (4th Generation) w Reflex [facility]; Future    4  Hypothyroidism, unspecified type  -     TSH, 3rd generation with Free T4 reflex; Future          Subjective:      Patient ID: Christiano Jade is a 46 y o  female  Patient here for annual physical   Here with daughter who translates for her  No concerns today  She has history of hypothyroidism  The following portions of the patient's history were reviewed and updated as appropriate: allergies, current medications, past family history, past medical history, past social history, past surgical history, and problem list     Review of Systems   Constitutional: Negative  HENT: Negative  Eyes: Negative  Respiratory: Negative  Cardiovascular: Negative  Gastrointestinal: Negative  Endocrine: Negative  Genitourinary: Negative  Musculoskeletal: Negative  Skin: Negative  Allergic/Immunologic: Negative  Neurological: Negative  Hematological: Negative  Psychiatric/Behavioral: Negative  Objective:      /68 (BP Location: Left arm, Patient Position: Sitting, Cuff Size: Standard)   Pulse 74   Temp (!) 97 4 °F (36 3 °C) (Temporal)   Resp 16   Ht 5' 2" (1 575 m)   Wt 74 1 kg (163 lb 6 4 oz)   LMP  (LMP Unknown)   SpO2 99%   BMI 29 89 kg/m²          Physical Exam  Vitals reviewed  Constitutional:       Appearance: She is well-developed  HENT:      Head: Normocephalic and atraumatic        Right Ear: External ear normal  Tympanic membrane is not erythematous or bulging  Left Ear: External ear normal  Tympanic membrane is not erythematous or bulging  Nose: Nose normal       Mouth/Throat:      Mouth: No oral lesions  Pharynx: No oropharyngeal exudate  Eyes:      General: No scleral icterus  Right eye: No discharge  Left eye: No discharge  Conjunctiva/sclera: Conjunctivae normal    Neck:      Thyroid: No thyromegaly  Cardiovascular:      Rate and Rhythm: Normal rate and regular rhythm  Heart sounds: Normal heart sounds  No murmur heard  No friction rub  No gallop  Pulmonary:      Effort: Pulmonary effort is normal  No respiratory distress  Breath sounds: No wheezing or rales  Chest:      Chest wall: No tenderness  Abdominal:      General: Bowel sounds are normal  There is no distension  Palpations: Abdomen is soft  There is no mass  Tenderness: There is no abdominal tenderness  There is no guarding or rebound  Musculoskeletal:         General: No tenderness or deformity  Normal range of motion  Cervical back: Normal range of motion and neck supple  Lymphadenopathy:      Cervical: No cervical adenopathy  Skin:     General: Skin is warm and dry  Coloration: Skin is not pale  Findings: No erythema or rash  Neurological:      Mental Status: She is alert and oriented to person, place, and time  Cranial Nerves: No cranial nerve deficit  Motor: No abnormal muscle tone  Coordination: Coordination normal       Deep Tendon Reflexes: Reflexes are normal and symmetric     Psychiatric:         Behavior: Behavior normal

## 2022-04-27 ENCOUNTER — APPOINTMENT (OUTPATIENT)
Dept: LAB | Facility: CLINIC | Age: 52
End: 2022-04-27
Payer: COMMERCIAL

## 2022-04-27 DIAGNOSIS — Z11.4 SCREENING FOR HIV (HUMAN IMMUNODEFICIENCY VIRUS): ICD-10-CM

## 2022-04-27 DIAGNOSIS — Z11.59 NEED FOR HEPATITIS C SCREENING TEST: ICD-10-CM

## 2022-04-27 DIAGNOSIS — E03.9 HYPOTHYROIDISM, UNSPECIFIED TYPE: ICD-10-CM

## 2022-04-27 LAB
ALBUMIN SERPL BCP-MCNC: 3.7 G/DL (ref 3.5–5)
ALP SERPL-CCNC: 90 U/L (ref 46–116)
ALT SERPL W P-5'-P-CCNC: 31 U/L (ref 12–78)
ANION GAP SERPL CALCULATED.3IONS-SCNC: 3 MMOL/L (ref 4–13)
AST SERPL W P-5'-P-CCNC: 21 U/L (ref 5–45)
BACTERIA UR QL AUTO: ABNORMAL /HPF
BASOPHILS # BLD AUTO: 0.04 THOUSANDS/ΜL (ref 0–0.1)
BASOPHILS NFR BLD AUTO: 1 % (ref 0–1)
BILIRUB SERPL-MCNC: 0.45 MG/DL (ref 0.2–1)
BILIRUB UR QL STRIP: NEGATIVE
BUN SERPL-MCNC: 13 MG/DL (ref 5–25)
CALCIUM SERPL-MCNC: 10 MG/DL (ref 8.3–10.1)
CHLORIDE SERPL-SCNC: 109 MMOL/L (ref 100–108)
CHOLEST SERPL-MCNC: 184 MG/DL
CLARITY UR: CLEAR
CO2 SERPL-SCNC: 29 MMOL/L (ref 21–32)
COLOR UR: ABNORMAL
CREAT SERPL-MCNC: 0.99 MG/DL (ref 0.6–1.3)
EOSINOPHIL # BLD AUTO: 0.06 THOUSAND/ΜL (ref 0–0.61)
EOSINOPHIL NFR BLD AUTO: 1 % (ref 0–6)
ERYTHROCYTE [DISTWIDTH] IN BLOOD BY AUTOMATED COUNT: 17.5 % (ref 11.6–15.1)
EST. AVERAGE GLUCOSE BLD GHB EST-MCNC: 128 MG/DL
GFR SERPL CREATININE-BSD FRML MDRD: 65 ML/MIN/1.73SQ M
GLUCOSE P FAST SERPL-MCNC: 97 MG/DL (ref 65–99)
GLUCOSE UR STRIP-MCNC: NEGATIVE MG/DL
HBA1C MFR BLD: 6.1 %
HCT VFR BLD AUTO: 38.1 % (ref 34.8–46.1)
HCV AB SER QL: NORMAL
HDLC SERPL-MCNC: 86 MG/DL
HGB BLD-MCNC: 11.2 G/DL (ref 11.5–15.4)
HGB UR QL STRIP.AUTO: NEGATIVE
IMM GRANULOCYTES # BLD AUTO: 0.02 THOUSAND/UL (ref 0–0.2)
IMM GRANULOCYTES NFR BLD AUTO: 0 % (ref 0–2)
KETONES UR STRIP-MCNC: NEGATIVE MG/DL
LDLC SERPL CALC-MCNC: 78 MG/DL (ref 0–100)
LEUKOCYTE ESTERASE UR QL STRIP: ABNORMAL
LYMPHOCYTES # BLD AUTO: 2.1 THOUSANDS/ΜL (ref 0.6–4.47)
LYMPHOCYTES NFR BLD AUTO: 42 % (ref 14–44)
MCH RBC QN AUTO: 19.7 PG (ref 26.8–34.3)
MCHC RBC AUTO-ENTMCNC: 29.4 G/DL (ref 31.4–37.4)
MCV RBC AUTO: 67 FL (ref 82–98)
MONOCYTES # BLD AUTO: 0.34 THOUSAND/ΜL (ref 0.17–1.22)
MONOCYTES NFR BLD AUTO: 7 % (ref 4–12)
NEUTROPHILS # BLD AUTO: 2.5 THOUSANDS/ΜL (ref 1.85–7.62)
NEUTS SEG NFR BLD AUTO: 49 % (ref 43–75)
NITRITE UR QL STRIP: NEGATIVE
NON-SQ EPI CELLS URNS QL MICRO: ABNORMAL /HPF
NRBC BLD AUTO-RTO: 0 /100 WBCS
PH UR STRIP.AUTO: 5.5 [PH]
PLATELET # BLD AUTO: 231 THOUSANDS/UL (ref 149–390)
PMV BLD AUTO: 11.8 FL (ref 8.9–12.7)
POTASSIUM SERPL-SCNC: 5.4 MMOL/L (ref 3.5–5.3)
PROT SERPL-MCNC: 8.4 G/DL (ref 6.4–8.2)
PROT UR STRIP-MCNC: NEGATIVE MG/DL
RBC # BLD AUTO: 5.69 MILLION/UL (ref 3.81–5.12)
RBC #/AREA URNS AUTO: ABNORMAL /HPF
SODIUM SERPL-SCNC: 141 MMOL/L (ref 136–145)
SP GR UR STRIP.AUTO: 1.01 (ref 1–1.03)
TRIGL SERPL-MCNC: 99 MG/DL
TSH SERPL DL<=0.05 MIU/L-ACNC: 1.76 UIU/ML (ref 0.45–4.5)
UROBILINOGEN UR STRIP-ACNC: <2 MG/DL
WBC # BLD AUTO: 5.06 THOUSAND/UL (ref 4.31–10.16)
WBC #/AREA URNS AUTO: ABNORMAL /HPF

## 2022-04-27 PROCEDURE — 80053 COMPREHEN METABOLIC PANEL: CPT | Performed by: FAMILY MEDICINE

## 2022-04-27 PROCEDURE — 80061 LIPID PANEL: CPT | Performed by: FAMILY MEDICINE

## 2022-04-27 PROCEDURE — 36415 COLL VENOUS BLD VENIPUNCTURE: CPT | Performed by: FAMILY MEDICINE

## 2022-04-27 PROCEDURE — 87389 HIV-1 AG W/HIV-1&-2 AB AG IA: CPT

## 2022-04-27 PROCEDURE — 84443 ASSAY THYROID STIM HORMONE: CPT

## 2022-04-27 PROCEDURE — 86803 HEPATITIS C AB TEST: CPT

## 2022-04-27 PROCEDURE — 85025 COMPLETE CBC W/AUTO DIFF WBC: CPT | Performed by: FAMILY MEDICINE

## 2022-04-27 PROCEDURE — 81001 URINALYSIS AUTO W/SCOPE: CPT | Performed by: FAMILY MEDICINE

## 2022-04-27 PROCEDURE — 83036 HEMOGLOBIN GLYCOSYLATED A1C: CPT | Performed by: FAMILY MEDICINE

## 2022-04-28 LAB — HIV 1+2 AB+HIV1 P24 AG SERPL QL IA: NORMAL

## 2022-05-09 DIAGNOSIS — E03.9 HYPOTHYROIDISM, UNSPECIFIED TYPE: ICD-10-CM

## 2022-05-09 RX ORDER — LEVOTHYROXINE SODIUM 0.07 MG/1
75 TABLET ORAL DAILY
Qty: 30 TABLET | Refills: 5 | Status: SHIPPED | OUTPATIENT
Start: 2022-05-09

## 2022-07-25 ENCOUNTER — HOSPITAL ENCOUNTER (OUTPATIENT)
Dept: MAMMOGRAPHY | Facility: MEDICAL CENTER | Age: 52
Discharge: HOME | End: 2022-07-25
Payer: COMMERCIAL

## 2022-07-25 VITALS — HEIGHT: 62 IN | BODY MASS INDEX: 30.06 KG/M2 | WEIGHT: 163.36 LBS

## 2022-07-25 DIAGNOSIS — Z12.31 ENCOUNTER FOR SCREENING MAMMOGRAM FOR MALIGNANT NEOPLASM OF BREAST: ICD-10-CM

## 2022-07-25 PROCEDURE — 77063 BREAST TOMOSYNTHESIS BI: CPT

## 2022-07-25 PROCEDURE — 77067 SCR MAMMO BI INCL CAD: CPT

## 2022-11-13 DIAGNOSIS — E03.9 HYPOTHYROIDISM, UNSPECIFIED TYPE: ICD-10-CM

## 2022-11-13 RX ORDER — LEVOTHYROXINE SODIUM 0.07 MG/1
TABLET ORAL
Qty: 30 TABLET | Refills: 5 | Status: SHIPPED | OUTPATIENT
Start: 2022-11-13

## 2022-12-11 DIAGNOSIS — E03.9 HYPOTHYROIDISM, UNSPECIFIED TYPE: ICD-10-CM

## 2022-12-11 RX ORDER — LEVOTHYROXINE SODIUM 0.07 MG/1
TABLET ORAL
Qty: 90 TABLET | Refills: 2 | Status: SHIPPED | OUTPATIENT
Start: 2022-12-11

## 2023-01-06 ENCOUNTER — OFFICE VISIT (OUTPATIENT)
Dept: FAMILY MEDICINE CLINIC | Facility: CLINIC | Age: 53
End: 2023-01-06

## 2023-01-06 VITALS
WEIGHT: 166 LBS | DIASTOLIC BLOOD PRESSURE: 74 MMHG | TEMPERATURE: 97.4 F | OXYGEN SATURATION: 96 % | BODY MASS INDEX: 29.41 KG/M2 | HEART RATE: 106 BPM | HEIGHT: 63 IN | SYSTOLIC BLOOD PRESSURE: 112 MMHG

## 2023-01-06 DIAGNOSIS — J40 BRONCHITIS: Primary | ICD-10-CM

## 2023-01-06 RX ORDER — AZITHROMYCIN 250 MG/1
TABLET, FILM COATED ORAL
Qty: 6 TABLET | Refills: 0 | Status: SHIPPED | OUTPATIENT
Start: 2023-01-06 | End: 2023-01-10

## 2023-01-06 RX ORDER — ALBUTEROL SULFATE 90 UG/1
2 AEROSOL, METERED RESPIRATORY (INHALATION) EVERY 6 HOURS PRN
Qty: 18 G | Refills: 1 | Status: SHIPPED | OUTPATIENT
Start: 2023-01-06

## 2023-01-06 NOTE — ASSESSMENT & PLAN NOTE
Acute symptomatic not responding to conservative treatments  COVID-negative rapid test at home  Denies shortness of breath chest pain and fever  Will recommend increase fluids vitamin C COVID and flu PCR sent to lab and start Z-Fernando and albuterol inhaler 2 puffs every 6 hours as needed for cough wheezing    Educated on side effects and proper use of medication and call with any worsening of symptoms or report to ED after hours

## 2023-01-06 NOTE — PROGRESS NOTES
COVID-19 Outpatient Progress Note    Assessment/Plan:    Problem List Items Addressed This Visit        Respiratory    Bronchitis - Primary     Acute symptomatic not responding to conservative treatments  COVID-negative rapid test at home  Denies shortness of breath chest pain and fever  Will recommend increase fluids vitamin C COVID and flu PCR sent to lab and start Z-Fernando and albuterol inhaler 2 puffs every 6 hours as needed for cough wheezing  Educated on side effects and proper use of medication and call with any worsening of symptoms or report to ED after hours         Relevant Medications    azithromycin (ZITHROMAX) 250 mg tablet    albuterol (Ventolin HFA) 90 mcg/act inhaler    Other Relevant Orders    Covid/Flu- Office Collect      Disposition:     PCR testing will be performed to test for COVID-19/Influenza  Discussed symptom directed medication options with patient  Discussed vitamin D, vitamin C, and/or zinc supplementation with patient  I have spent 15 minutes directly with the patient  Greater than 50% of this time was spent in counseling/coordination of care regarding: instructions for management and patient and family education  Encounter provider: SONIA Best     Provider located at: [address] [place] [address] [id number] [phone]     Recent Visits  No visits were found meeting these conditions  Showing recent visits within past 7 days and meeting all other requirements  Today's Visits  Date Type Provider Dept   01/06/23 Office Visit Bello Best [facility] 298 today's visits and meeting all other requirements  Future Appointments  No visits were found meeting these conditions  Showing future appointments within next 150 days and meeting all other requirements     Subjective:   Hazel Brown is a 45 y o  female who is concerned about COVID-19   Patient's symptoms include chills, fatigue, nasal congestion, cough, chest tightness, myalgias and headache  Patient denies fever, malaise, rhinorrhea, sore throat, anosmia, loss of taste, shortness of breath, abdominal pain, nausea, vomiting and diarrhea  COVID-19 vaccination status: Not vaccinated    Exposure:   Contact with a person who is under investigation (PUI) for or who is positive for COVID-19 within the last 14 days?: No    Hospitalized recently for fever and/or lower respiratory symptoms?: No      Currently a healthcare worker that is involved in direct patient care?: No      Works in a special setting where the risk of COVID-19 transmission may be high? (this may include long-term care, correctional and group home facilities; homeless shelters; assisted-living facilities and group homes ): No      Resident in a special setting where the risk of COVID-19 transmission may be high? (this may include long-term care, correctional and group home facilities; homeless shelters; assisted-living facilities and group homes ): No      Lab Results   Component Value Date    SARSCOV2 Negative 10/13/2021    [facility] 98 Not Detected 06/10/2020       Review of Systems   Constitutional: Positive for activity change, appetite change, chills and fatigue  Negative for fever  HENT: Positive for congestion  Negative for rhinorrhea and sore throat  Respiratory: Positive for cough and chest tightness  Negative for shortness of breath  Cardiovascular: Negative for chest pain and palpitations  Gastrointestinal: Negative for abdominal pain, constipation, diarrhea, nausea and vomiting  Musculoskeletal: Positive for myalgias  Neurological: Positive for headaches       Current Outpatient Medications on File Prior to Visit   Medication Sig   • levothyroxine 75 mcg tablet TAKE 1 TABLET BY MOUTH EVERY DAY   • omeprazole (PriLOSEC) 40 MG capsule TAKE 1 CAPSULE BY MOUTH EVERY DAY BEFORE BREAKFAST   • [DISCONTINUED] azelastine (ASTELIN) 0 1 % nasal spray 2 sprays into each nostril 2 (two) times a day Use in each nostril as directed (Patient not taking: Reported on 12/17/2021)   • [DISCONTINUED] valACYclovir (VALTREX) 1,000 mg tablet Take 1 tablet (1,000 mg total) by mouth 3 (three) times a day for 7 days (Patient not taking: Reported on 1/6/2023)       Objective:    /74 (BP Location: Left arm, Patient Position: Sitting, Cuff Size: Standard)   Pulse (!) 106   Temp (!) 97 4 °F (36 3 °C) (Tympanic)   Ht 5' 3 39" (1 61 m)   Wt 75 3 kg (166 lb)   LMP  (LMP Unknown)   SpO2 96%   BMI 29 05 kg/m²      Physical Exam  Vitals and nursing note reviewed  Constitutional:       General: She is not in acute distress  Appearance: Normal appearance  She is not ill-appearing, toxic-appearing or diaphoretic  HENT:      Head: Normocephalic and atraumatic  Right Ear: Tympanic membrane, ear canal and external ear normal  There is no impacted cerumen  Left Ear: Tympanic membrane, ear canal and external ear normal  There is no impacted cerumen  Nose: No rhinorrhea  Mouth/Throat:      Pharynx: No posterior oropharyngeal erythema  Eyes:      General: No scleral icterus  Right eye: No discharge  Left eye: No discharge  Conjunctiva/sclera: Conjunctivae normal    Cardiovascular:      Rate and Rhythm: Normal rate and regular rhythm  Pulmonary:      Effort: Pulmonary effort is normal  No respiratory distress  Breath sounds: No stridor  Wheezing present  No rhonchi  Musculoskeletal:         General: Normal range of motion  Cervical back: Normal range of motion  Lymphadenopathy:      Cervical: Cervical adenopathy present  Skin:     Coloration: Skin is not jaundiced or pale  Findings: No bruising, erythema, lesion or rash  Neurological:      Mental Status: She is alert and oriented to person, place, and time  Psychiatric:         Mood and Affect: Mood normal          Behavior: Behavior normal          Thought Content:  Thought content normal          Judgment: Judgment normal        Shannon Devi Stain

## 2023-01-07 LAB
FLUAV RNA RESP QL NAA+PROBE: NEGATIVE
FLUBV RNA RESP QL NAA+PROBE: NEGATIVE
SARS-COV-2 RNA RESP QL NAA+PROBE: NEGATIVE

## 2023-03-02 ENCOUNTER — ANNUAL EXAM (OUTPATIENT)
Dept: OBGYN CLINIC | Facility: MEDICAL CENTER | Age: 53
End: 2023-03-02

## 2023-03-02 VITALS
BODY MASS INDEX: 30.12 KG/M2 | SYSTOLIC BLOOD PRESSURE: 124 MMHG | WEIGHT: 170 LBS | HEIGHT: 63 IN | DIASTOLIC BLOOD PRESSURE: 82 MMHG

## 2023-03-02 DIAGNOSIS — Z01.419 ENCOUNTER FOR GYNECOLOGICAL EXAMINATION: Primary | ICD-10-CM

## 2023-03-02 NOTE — PROGRESS NOTES
Renal (Kidney) Insufficiency   Your kidneys remove waste products and extra water from your body. When your kidneys don’t work as they should, waste products build up in your blood. The early stage of this process is called renal insufficiency. If renal insufficiency gets worse, you can develop chronic renal failure. This allows extra water, waste, and toxic substances to build up in your body. This can become life threatening. You may need dialysis or a kidney transplant. The most serious form of renal insufficiency is end-stage renal disease.   Diabetes is the main cause of renal insufficiency.  Other causes include:  · High blood pressure  · Hardening of the arteries  · Lupus  · Inflammation of the blood vessels (vasculitis)  · Viral or bacterial infection  Some over-the-counter (OTC) pain medicines can cause renal failure if you take them for a long time. These include aspirin, ibuprofen, naproxen, and other nonsteroidal anti-inflammatory drugs (NSAIDs).   Home care  Follow these tips when caring for yourself at home:  · If you have diabetes, talk with your healthcare provider about controlling your blood sugar. Ask if you need to make any changes to your diet, lifestyle, or medicines.  · If you have high blood pressure:  ? Take your prescribed medicine. Your goal is to lower your blood pressure to less than 130/80, or as recommended by your provider.  ? Start a regular exercise program that you enjoy. Check with your healthcare provider to be sure your planned exercise program is right for you.  ? Cut back on the amount of salt (sodium) you eat. Your healthcare provider can tell you how much salt each day is safe for you.  ? Monitor and record your blood pressure at home on a regular schedule. Ask your provider to teach you how to correctly check your BP. Bring the BP records with you to your appointments.  · If you are overweight, talk with your healthcare provider about a weight loss plan.  · If you smoke,  ASSESSMENT & PLAN: Sandra Francis is a 45 y o  [id number] with normal gynecologic exam     1   Routine well woman exam done today  2  Pap and HPV:  The patient's last pap and hpv was   It was normal     Pap with cotesting was not done today  Current ASCCP Guidelines reviewed  3   Mammogram ordered  4  Colorectal cancer screening was not ordered  5  The following were reviewed in today's visit: breast self exam, mammography screening ordered, menopause, exercise and healthy diet      CC:  Annual Gynecologic Examination    HPI: Sandra Francis is a 45 y o  [id number] who presents for annual gynecologic examination  She has the following concerns:  None     Health Maintenance:    She wears her seatbelt routinely  She does perform regular monthly self breast exams  She feels safe at home       Past Medical History:   Diagnosis Date   • GERD (gastroesophageal reflux disease)    • Hypothyroid        Past Surgical History:   Procedure Laterality Date   • LAPAROSCOPY     • TUBAL LIGATION         Past OB/Gyn History:  OB History        5    Para   4    Term   3       1    AB   1    Living   3       SAB   1    IAB        Ectopic        Multiple        Live Births   3                   Family History   Problem Relation Age of Onset   • Cancer Mother 48        Sarcoma   • Uterine cancer Mother 64   • No Known Problems Father    • No Known Problems Daughter    • No Known Problems Maternal Grandmother    • No Known Problems Maternal Grandfather    • No Known Problems Paternal Grandmother    • No Known Problems Paternal Grandfather    • No Known Problems Son    • No Known Problems Son    • No Known Problems Maternal Aunt    • No Known Problems Paternal Aunt    • No Known Problems Paternal Aunt    • No Known Problems Paternal Aunt    • No Known Problems Paternal Aunt        Social History:  Social History     Socioeconomic History   • Marital status: /Civil Union     Spouse name: Not on file   • Number of children: Not on file   • Years of education: Not on file   • Highest education level: Not on file   Occupational History   • Not on file   Tobacco Use   • Smoking status: Never   • Smokeless tobacco: Never   Vaping Use   • Vaping Use: Never used   Substance and Sexual Activity   • Alcohol use: No   • Drug use: No   • Sexual activity: Yes     Partners: Male     Birth control/protection: Post-menopausal   Other Topics Concern   • Not on file   Social History Narrative    Daily caffeine use- 1 cup of tea     Social Determinants of Health     Financial Resource Strain: Not on file   Food Insecurity: Not on file   Transportation Needs: Not on file   Physical Activity: Not on file   Stress: Not on file   Social Connections: Not on file   Intimate Partner Violence: Not on file   Housing Stability: Not on file       No Known Allergies    Current Outpatient Medications:   •  levothyroxine 75 mcg tablet, TAKE 1 TABLET BY MOUTH EVERY DAY, Disp: 90 tablet, Rfl: 2  •  omeprazole (PriLOSEC) 40 MG capsule, TAKE 1 CAPSULE BY MOUTH EVERY DAY BEFORE BREAKFAST (Patient taking differently: AS NEEDED), Disp: 30 capsule, Rfl: 3  •  albuterol (Ventolin HFA) 90 mcg/act inhaler, Inhale 2 puffs every 6 (six) hours as needed for wheezing (Patient not taking: Reported on 3/2/2023), Disp: 18 g, Rfl: 1      Review of Systems  Constitutional :no fever, feels well, no tiredness, no recent weight gain or loss  ENT: no ear ache, no loss of hearing, no nosebleeds or nasal discharge, no sore throat or hoarseness  Cardiovascular: no complaints of slow or fast heart beat, no chest pain, no palpitations, no leg claudication or lower extremity edema    Respiratory: no complaints of shortness of shortness of breath, no MUNIZ  Breasts:no complaints of breast pain, breast lump, or nipple discharge  Gastrointestinal: no complaints of abdominal pain, constipation, nausea, vomiting, or diarrhea or bloody stools  Genitourinary : no complaints of dysuria, quit. Smoking makes kidney disease worse. Talk with your healthcare provider about ways to help you quit. For more information, visit:  ? smokefree.gov/sites/default/files/pdf/clearing-the-air-accessible.pdf  ? www.smokefree.gov  ? www.cancer.org/healthy/stayawayfromtobacco/guidetoquittingsmoking/  · Talk with your healthcare provider about any restrictions you should make in your diet. In general, you should limit the amount of protein, salt, potassium, and phosphorus. Don’t drink too many fluids. Don’t add salt at the table, and stay away from salty foods. You may need a calcium supplement to help prevent osteoporosis.  · If you or your family feel overwhelmed by the dietary restrictions, ask for a referral to a registered dietitian. This person can help you understand and plan your diet.  · Talk with your healthcare provider about any medicines you are currently taking to find out if they need to be reduced or stopped.  · Before starting any over-the-counter (OTC) medicines, herbal supplements, or vitamins, talk with your healthcare provider or pharmacist. Make sure they won't harm your kidneys.  · Don’t take the following OTC medicines, or talk with your healthcare provider before you take them:  ? Aspirin, ibuprofen, naproxen, and other NSAIDs. You may be able to use these for a short time to help with fever or pain.  ? Laxatives and antacids with magnesium or aluminum  ? Phospho soda enemas with phosphorus  ? Certain stomach acid-blocking medicine such as cimetidine or ranitidine  ? Decongestants with pseudoephedrine  ? Herbal supplements  Follow-up care  Follow up with your healthcare provider, or as advised.  Contact one of the following for more information:   · American Association of Kidney Patients, www.aakp.org  · National Kidney Foundation, www.kidney.org  · American Kidney Fund, www.kidneyfund.org  · National Kidney Disease Education Program, www.nkdep.nih.gov  Call 911  Call 911 if any of the  following occur:   · Severe weakness, dizziness, fainting, drowsiness, or confusion  · Chest pain or shortness of breath  · Heart beating fast, slowly, or irregularly  When to get medical advice  Call your healthcare provider right away if any of these occur:  · Nausea or vomiting  · Fever of 100.4°F (38°C) or higher, or as directed by your healthcare provider  · Unexpected weight gain or swelling in the legs, ankles, or around your eyes  · You don’t urinate as much as normal, or you aren’t able to urinate  [employer] last reviewed this educational content on 2/1/2020 © 2000-2021 [employer]. All rights reserved. This information is not intended as a substitute for professional medical care. Always follow your healthcare professional's instructions.          Weakness with Uncertain Cause  Based on your exam today, the exact cause of your weakness is not clear. But your weakness does not seem to be a sign of a serious illness at this time. Keep an eye on your symptoms and get medical advice as instructed below.   Home care  · Rest at home today. Don't over-exert yourself.  · Take any medicine as prescribed.  · For the next few days, drink extra fluids (unless your healthcare provider wants you to restrict fluids for other reasons). Don't skip meals.  · Unless otherwise directed, continue to take any prescription medicines.  · Contact your healthcare provider if you have any questions or concerns.    Follow-up care  Follow up with your healthcare provider, or as advised.  When to seek medical advice  Call your healthcare provider right away for any of the following:  · Symptoms get worse  · Symptoms don't start getting better within 2 days  · Fever of 100.4º F (38º C) or higher, or as directed by your healthcare provider  Call 911  Call 911 for any of these:   · Chest, arm, neck, jaw, or upper back pain  · Trouble breathing  · Numbness or weakness of the face, one arm, or one leg  · Slurred speech,  incontinence, pelvic pain, no dysmenorrhea, vaginal discharge or abnormal vaginal bleeding and as noted in HPI  Musculoskeletal: no complaints of arthralgia, no myalgia, no joint swelling or stiffness, no limb pain or swelling  Integumentary: no complaints of skin rash or lesion, itching or dry skin  Neurological: no complaints of headache, no confusion, no numbness or tingling, no dizziness or fainting    Objective      /82   Ht 5' 3" (1 6 m)   Wt 77 1 kg (170 lb)   LMP  (LMP Unknown)   BMI 30 11 kg/m²     General:   appears stated age, cooperative, alert normal mood and affect   Lungs: unlabored breathing    Abdomen: soft, non-tender, without masses or organomegaly   Vulva: normal, normal female genitalia, Bartholin's, Urethra, Rozel normal, no lesions   Vagina: normal vagina, no discharge, exudate, lesion, or erythema   Urethra: normal   Cervix: Normal, no discharge  Nontender     Uterus: normal size, contour, position, consistency, mobility, non-tender   Adnexa: no mass, fullness, tenderness   Psychiatric orientation to person, place, and time: normal  mood and affect: normal confusion, or trouble speaking, walking, or seeing  · Blood in vomit or stool (black or red color)  · Severe headache  · Loss of consciousness  [employer] last reviewed this educational content on 7/1/2020 © 2000-2021 [employer]. All rights reserved. This information is not intended as a substitute for professional medical care. Always follow your healthcare professional's instructions.        Vomiting (Adult)  Vomiting is a common symptom that may be due to different causes. These include gastroenteritis (\"stomach flu\"), food poisoning and gastritis. There are other more serious causes of vomiting which may be hard to diagnose early in the illness. Therefore, it is important to watch for the warning signs listed below.  The main danger from repeated vomiting is dehydration. This is due to excess loss of water and minerals from the body. When this occurs, your body fluids must be replaced.  Home care  · If symptoms are severe, rest at home for the next 24 hours.  · Because your symptoms may be from an infection, wash your hands often and well. If soap and water are not available, use alcohol-based  to keep from spreading the infection to others.  · Wash your hands for at least 20 seconds. Humming the happy birthday song twice while you wash is an easy way to make sure you've washed for 20 seconds.  · Wash your hands after using the toilet, before and after preparing food, before eating food, after changing a diaper, cleaning a wound, caring for a sick person, and blowing your nose, coughing, or sneezing. You should also wash your hands after caring for someone who is sick, touching pet food, or treats, and touching an animal, or animal waste.  · You may use acetaminophen or NSAID medicines like ibuprofen or naproxen to control fever, unless another medicine was prescribed. If you have chronic liver or kidney disease or ever had a stomach ulcer or gastrointestinal bleeding, talk with your  doctor before using these medicines. Aspirin should never be used in anyone under 18 years of age who is ill with a fever. It may cause severe liver damage. Don't use NSAID medicines if you are already taking one for another condition (like arthritis) or are on aspirin (such as for heart disease, or after a stroke)  · Don't use tobacco and or drink alcohol, which may worsen your symptoms.  · If medicines for vomiting were prescribed, take as directed.  · Once vomiting stops, then follow these guidelines:  During the first 12 to 24 hours follow the diet below:  · Fruit juices. Apple, grape juice, clear fruit drinks, and electrolyte replacement drinks.  · Beverages. Soft drinks without caffeine; mineral water (plain or flavored), decaffeinated tea and coffee.  · Soups. Clear broth and bouillon  · Desserts. Plain gelatin, ice pops, and fruit juice bars. As you feel better, you may add 6 to 8 ounces of yogurt per day.  During the next 24 hours you may add the following to the above:  · Hot cereal, plain toast, bread, rolls, crackers  · Plain noodles, rice, mashed potatoes, chicken noodle or rice soup  · Unsweetened canned fruit such as applesauce, bananas (avoid pineapple and citrus)  · Limit caffeine and chocolate. No spices or seasonings except salt.  During the next 24 hours:  Gradually resume a normal diet, as you feel better and your symptoms lessen.  Follow-up care  Follow up with your healthcare provider, or as advised.  When to seek medical advice  Call your healthcare provider right away if any of these occur:  · Constant right-sided lower belly pain or increasing general belly pain  · Continued vomiting (unable to keep liquids down) for 24 hours  · Vomiting blood or coffee grounds  · Swollen belly  · Frequent diarrhea (more than 5 times a day); blood (red or black color) or mucus in diarrhea  · Reduced urine output or extreme thirst  · Weakness, dizziness or fainting  · Unusually drowsy or confused  · Fever of  100.4°F (38°C) oral or higher, or as directed  · Yellow color of the eyes or skin  [employer] last reviewed this educational content on 3/1/2018  © 9427-6386 [employer]. All rights reserved. This information is not intended as a substitute for professional medical care. Always follow your healthcare professional's instructions.

## 2023-03-02 NOTE — PATIENT INSTRUCTIONS
Mammogram   AMBULATORY CARE:   What you need to know about a mammogram:  A mammogram is an x-ray of your breasts to screen for breast cancer  Your healthcare provider will talk with you about the benefits and risks of mammograms  Together you will decide when you will get your first mammogram  This is usually at age 39 or 46  Your provider may recommend you start at 36 or younger if your risk for breast cancer is high  Mammograms usually continue every 1 to 2 years until age 76  How to prepare for a mammogram:   Do not use deodorant, powder, lotion, or perfume  These products may cause particles to appear on your mammogram     Wear a 2-piece outfit  If your breasts are tender before your monthly period, do not have a mammogram during this time  Schedule your mammogram for 1 week after your period ends  If you are breastfeeding, express as much milk as possible before the mammogram     Bring a list of the dates and places of your past mammograms and other breast tests or treatments  What will happen during a mammogram:  A top view and a side view x-ray are usually done for each breast  Tell healthcare providers if you have breast implants or breast problems before you have your mammogram  You may need extra x-rays of each breast   You will be given a hospital gown  Take off your clothes from the waist up  Wear the hospital gown so that it opens in the front  You will sit or stand next to a small x-ray machine  The healthcare provider will help you place one of your breasts on the x-ray plate  Your arm and breast will be moved until your breast is in the correct position  Your breast will be gently pressed between 2 plates for a few seconds while the x-ray is taken  This may be uncomfortable  You will be asked to hold your breath while the x-ray is taken  Another x-ray will be taken of the same breast after the position of the x-ray machine has been changed      Your other breast will be x-rayed the same way  What will happen after your mammogram:  Your breasts may feel tender for a short time after the mammogram  You may go back to your regular activities  Ask your healthcare provider when you should receive the results of your mammogram   Risks of a mammogram:  You will be exposed to a small amount of radiation  Some breast cancers may not show up on mammograms  Call your doctor if:   You do not receive your results when expected  You have questions or concerns about the mammogram     Follow up with your doctor as directed:  Write down your questions so you remember to ask them during your visits  © Copyright Sam[employer] 2022 Information is for End User's use only and may not be sold, redistributed or otherwise used for commercial purposes  The above information is an  only  It is not intended as medical advice for individual conditions or treatments  Talk to your doctor, nurse or pharmacist before following any medical regimen to see if it is safe and effective for you

## 2023-06-17 ENCOUNTER — HOSPITAL ENCOUNTER (EMERGENCY)
Facility: HOSPITAL | Age: 53
Discharge: HOME | End: 2023-06-17
Attending: EMERGENCY MEDICINE
Payer: COMMERCIAL

## 2023-06-17 VITALS
RESPIRATION RATE: 16 BRPM | DIASTOLIC BLOOD PRESSURE: 87 MMHG | TEMPERATURE: 97.2 F | HEART RATE: 74 BPM | SYSTOLIC BLOOD PRESSURE: 154 MMHG | OXYGEN SATURATION: 100 %

## 2023-06-17 DIAGNOSIS — R04.0 ACUTE ANTERIOR EPISTAXIS: Primary | ICD-10-CM

## 2023-06-17 LAB
ANISOCYTOSIS BLD QL SMEAR: PRESENT
BASOPHILS # BLD MANUAL: 0 THOUSAND/UL (ref 0–0.1)
BASOPHILS NFR MAR MANUAL: 0 % (ref 0–1)
EOSINOPHIL # BLD MANUAL: 0 THOUSAND/UL (ref 0–0.4)
EOSINOPHIL NFR BLD MANUAL: 0 % (ref 0–6)
ERYTHROCYTE [DISTWIDTH] IN BLOOD BY AUTOMATED COUNT: 16 % (ref 11.6–15.1)
HCT VFR BLD AUTO: 34.9 % (ref 34.8–46.1)
HGB BLD-MCNC: 10.5 G/DL (ref 11.5–15.4)
LYMPHOCYTES # BLD AUTO: 1.83 THOUSAND/UL (ref 0.6–4.47)
LYMPHOCYTES # BLD AUTO: 32 % (ref 14–44)
MCH RBC QN AUTO: 19.8 PG (ref 26.8–34.3)
MCHC RBC AUTO-ENTMCNC: 30.1 G/DL (ref 31.4–37.4)
MCV RBC AUTO: 66 FL (ref 82–98)
MONOCYTES # BLD AUTO: 0.23 THOUSAND/UL (ref 0–1.22)
MONOCYTES NFR BLD: 4 % (ref 4–12)
NEUTROPHILS # BLD MANUAL: 3.67 THOUSAND/UL (ref 1.85–7.62)
NEUTS SEG NFR BLD AUTO: 64 % (ref 43–75)
PLATELET # BLD AUTO: 233 THOUSANDS/UL (ref 149–390)
PLATELET BLD QL SMEAR: ADEQUATE
PMV BLD AUTO: 10.3 FL (ref 8.9–12.7)
RBC # BLD AUTO: 5.29 MILLION/UL (ref 3.81–5.12)
WBC # BLD AUTO: 5.73 THOUSAND/UL (ref 4.31–10.16)

## 2023-06-17 PROCEDURE — 85007 BL SMEAR W/DIFF WBC COUNT: CPT | Performed by: EMERGENCY MEDICINE

## 2023-06-17 PROCEDURE — 85027 COMPLETE CBC AUTOMATED: CPT | Performed by: EMERGENCY MEDICINE

## 2023-06-17 PROCEDURE — 99283 EMERGENCY DEPT VISIT LOW MDM: CPT

## 2023-06-17 PROCEDURE — 36415 COLL VENOUS BLD VENIPUNCTURE: CPT | Performed by: EMERGENCY MEDICINE

## 2023-06-17 RX ORDER — OXYMETAZOLINE HYDROCHLORIDE 0.05 G/100ML
2 SPRAY NASAL ONCE
Status: COMPLETED | OUTPATIENT
Start: 2023-06-17 | End: 2023-06-17

## 2023-06-17 RX ADMIN — OXYMETAZOLINE HYDROCHLORIDE 2 SPRAY: 0.05 SPRAY NASAL at 10:16

## 2023-06-17 NOTE — DISCHARGE INSTRUCTIONS
Nosebleed   WHAT YOU NEED TO KNOW:   A nosebleed, or epistaxis, occurs when one or more of the blood vessels in your nose break  You may have dark or bright red blood from one or both nostrils  A nosebleed is most commonly caused by dry air or picking your nose  A direct blow to your nose, irritation from a cold or allergies, or a foreign object can also cause a nosebleed  DISCHARGE INSTRUCTIONS:   Return to the emergency department if:   Your nasal packing if it was placed is getting more soaked with blood  Your nose is still bleeding after 60 minutes, even after you have tried twice to pinch it for 30 minutes without letting go  You have a foul-smelling discharge coming out of your nose  You feel so weak and dizzy that you have trouble standing up  You have trouble breathing or talking  Contact your healthcare provider if:   You have a fever and are vomiting  You have pain in and around your nose that is getting worse even after you take pain medicines  Your nasal pack is loose  You have questions or concerns about your condition or care  First aid:   Sit up and lean forward  This will help prevent you from swallowing blood  Spit blood and saliva into a bowl  Apply pressure to your nose  Use 2 fingers or the clamp if it was available to pinch your nose shut for 20-30 minutes  This will help stop the bleeding  Breathe through your mouth  Apply ice  on the bridge of your nose to decrease swelling and bleeding  Use a cold pack or put crushed ice in a plastic bag  Cover it with a towel to protect your skin  Pack your nose  with a cotton ball, tissue, tampon, or gauze bandage to stop the bleeding  Prevent another nosebleed:   Keep your nose moist   Put a small amount of petroleum jelly inside your nostrils as needed  Use a saline (saltwater) nasal spray  Do not put anything else inside your nose unless your healthcare provider says it is okay    Use a cool mist humidifier to increase air moisture in your home  This will help your nose stay moist      Do not pick or blow your nose for at least a week  You can irritate or damage your nose if you pick it  Blowing your nose too hard may cause the bleeding to start again  Do not bend over or strain as this can cause the bleeding to start again  Avoid irritants  such as tobacco smoke or chemical sprays such as

## 2023-06-17 NOTE — ED PROVIDER NOTES
History  Chief Complaint   Patient presents with   • Nose Bleed     Nose bleed x 30-40 mins       46 yo F c/o onset of nose bleed about 40 min ago at home, that has not resolved easily at home  She had nosebleed at 2am, that she was able to get to stop after about 15 mins, and has had some prior nosebleeds always from the left periodically over the past few months, that have always resolved  She denies any injury, denies using any instrumentation, and does not take blood thinners or use any nasal sprays  History provided by:  Patient      Prior to Admission Medications   Prescriptions Last Dose Informant Patient Reported? Taking?    albuterol (Ventolin HFA) 90 mcg/act inhaler   No No   Sig: Inhale 2 puffs every 6 (six) hours as needed for wheezing   Patient not taking: Reported on 3/2/2023   levothyroxine 75 mcg tablet  Family Member No No   Sig: TAKE 1 TABLET BY MOUTH EVERY DAY   omeprazole (PriLOSEC) 40 MG capsule  Family Member No No   Sig: TAKE 1 CAPSULE BY MOUTH EVERY DAY BEFORE BREAKFAST   Patient taking differently: AS NEEDED      Facility-Administered Medications: None       Past Medical History:   Diagnosis Date   • GERD (gastroesophageal reflux disease)    • Hypothyroid        Past Surgical History:   Procedure Laterality Date   • LAPAROSCOPY     • TUBAL LIGATION         Family History   Problem Relation Age of Onset   • Cancer Mother 48        Sarcoma   • Uterine cancer Mother 64   • No Known Problems Father    • No Known Problems Daughter    • No Known Problems Maternal Grandmother    • No Known Problems Maternal Grandfather    • No Known Problems Paternal Grandmother    • No Known Problems Paternal Grandfather    • No Known Problems Son    • No Known Problems Son    • No Known Problems Maternal Aunt    • No Known Problems Paternal Aunt    • No Known Problems Paternal Aunt    • No Known Problems Paternal Aunt    • No Known Problems Paternal Aunt      I have reviewed and agree with the history as documented  E-Cigarette/Vaping   • E-Cigarette Use Never User      E-Cigarette/Vaping Substances   • Nicotine No    • THC No    • CBD No    • Flavoring No    • Other No    • Unknown No      Social History     Tobacco Use   • Smoking status: Never   • Smokeless tobacco: Never   Vaping Use   • Vaping Use: Never used   Substance Use Topics   • Alcohol use: No   • Drug use: No       Review of Systems    Physical Exam  Physical Exam  Vitals and nursing note reviewed  Constitutional:       General: She is not in acute distress  Appearance: She is well-developed  She is not diaphoretic  HENT:      Head: Normocephalic and atraumatic  Right Ear: External ear normal       Left Ear: External ear normal       Nose:      Left Nostril: Epistaxis (left sided epistaxis nearly resolved on arrival) present  Eyes:      Conjunctiva/sclera: Conjunctivae normal       Pupils: Pupils are equal, round, and reactive to light  Cardiovascular:      Rate and Rhythm: Normal rate and regular rhythm  Pulmonary:      Effort: Pulmonary effort is normal    Abdominal:      Palpations: Abdomen is soft  Musculoskeletal:         General: Normal range of motion  Cervical back: Normal range of motion and neck supple  Skin:     General: Skin is warm and dry  Findings: No rash  Neurological:      Mental Status: She is alert and oriented to person, place, and time  Gait: Gait normal    Psychiatric:         Behavior: Behavior normal          Thought Content:  Thought content normal          Judgment: Judgment normal          Vital Signs  ED Triage Vitals [06/17/23 1008]   Temperature Pulse Respirations Blood Pressure SpO2   (!) 97 2 °F (36 2 °C) 74 16 154/87 100 %      Temp src Heart Rate Source Patient Position - Orthostatic VS BP Location FiO2 (%)   -- Monitor -- -- --      Pain Score       No Pain           Vitals:    06/17/23 1008   BP: 154/87   Pulse: 74         Visual Acuity      ED Medications  Medications oxymetazoline (AFRIN) 0 05 % nasal spray 2 spray (2 sprays Each Nare Given 6/17/23 1016)       Diagnostic Studies  Results Reviewed     Procedure Component Value Units Date/Time    CBC and differential [[id number]]  (Abnormal) Collected: 06/17/23 1052    Lab Status: Final result Specimen: Blood from Arm, Right Updated: 06/17/23 1121     WBC 5 73 Thousand/uL      RBC 5 29 Million/uL      Hemoglobin 10 5 g/dL      Hematocrit 34 9 %      MCV 66 fL      MCH 19 8 pg      MCHC 30 1 g/dL      RDW 16 0 %      MPV 10 3 fL      Platelets 467 Thousands/uL     Narrative: This is an appended report  These results have been appended to a previously verified report  Manual Differential(PHLEBS Do Not Order) [[id number]] Collected: 06/17/23 1052    Lab Status: In process Specimen: Blood from Arm, Right Updated: 06/17/23 1121                 No orders to display              Procedures  Epistaxis management    Date/Time: 6/17/2023 10:15 AM    Performed by: Ozzy Gaxiola MD  Authorized by: Ozzy Gaxiola MD  Universal Protocol:  Consent: Verbal consent obtained  Procedure details:     Treatment site:  L anterior             ED Course  ED Course as of 06/17/23 1130   Sat Jun 17, 2023   1115 Removed the simple pack, and no ongoing bleeding from posterior or anterior  It was almost stopped when treatment applied, and I suspect this was anterior from irritation  She does not need any packing  Insturctions given for home care and return precautions                                                 MDM    Disposition  Final diagnoses:   Acute anterior epistaxis - left side     Time reflects when diagnosis was documented in both MDM as applicable and the Disposition within this note     Time User Action Codes Description Comment    6/17/2023 11:17 AM Dwight JORGE Add [R04 0] Acute anterior epistaxis     6/17/2023 11:17 AM Zofia Dumont Modify [R04 0] Acute anterior epistaxis left side      ED Disposition ED Disposition   Discharge    Condition   Good    Date/Time   Sat Jun 17, 2023 11:17 AM    Comment   Diana Terell discharge to home/self care  Follow-up Information     Follow up With Specialties Details Why Contact Info    [facility]  Call  If symptoms worsen [phone]  [address]          Discharge Medication List as of 6/17/2023 11:17 AM      CONTINUE these medications which have NOT CHANGED    Details   albuterol (Ventolin HFA) 90 mcg/act inhaler Inhale 2 puffs every 6 (six) hours as needed for wheezing, Starting Fri 1/6/2023, Normal      levothyroxine 75 mcg tablet TAKE 1 TABLET BY MOUTH EVERY DAY, Normal      omeprazole (PriLOSEC) 40 MG capsule TAKE 1 CAPSULE BY MOUTH EVERY DAY BEFORE BREAKFAST, Normal             No discharge procedures on file      PDMP Review     None          ED Provider  Electronically Signed by           Karen Astorga MD  06/17/23 9323

## 2023-07-17 ENCOUNTER — OFFICE VISIT (OUTPATIENT)
Dept: FAMILY MEDICINE CLINIC | Facility: CLINIC | Age: 53
End: 2023-07-17
Payer: COMMERCIAL

## 2023-07-17 VITALS
BODY MASS INDEX: 31.1 KG/M2 | HEART RATE: 80 BPM | HEIGHT: 62 IN | OXYGEN SATURATION: 97 % | DIASTOLIC BLOOD PRESSURE: 74 MMHG | TEMPERATURE: 97 F | SYSTOLIC BLOOD PRESSURE: 120 MMHG | WEIGHT: 169 LBS

## 2023-07-17 DIAGNOSIS — R04.0 BLEEDING FROM THE NOSE: Primary | ICD-10-CM

## 2023-07-17 PROBLEM — J40 BRONCHITIS: Status: RESOLVED | Noted: 2023-01-06 | Resolved: 2023-07-17

## 2023-07-17 PROCEDURE — 99213 OFFICE O/P EST LOW 20 MIN: CPT | Performed by: FAMILY MEDICINE

## 2023-07-17 NOTE — PROGRESS NOTES
Assessment/Plan: No significant findings on physical exam.  Recommend consideration for follow-up with ENT specialist.  Card given. 1. Bleeding from the nose  -     Ambulatory Referral to Otolaryngology; Future          Subjective:      Patient ID: Tristan Adams is a 45 y.o. female. With occasional nosebleeds over the last 1 month. These occur from the left nostril. She was seen in the emergency room for this. Nose Bleed              The following portions of the patient's history were reviewed and updated as appropriate: allergies, current medications, past family history, past medical history, past social history, past surgical history, and problem list.    Review of Systems   HENT: Positive for nosebleeds. Objective:      /74 (BP Location: Left arm, Patient Position: Sitting, Cuff Size: Standard)   Pulse 80   Temp (!) 97 °F (36.1 °C) (Tympanic)   Ht 5' 2" (1.575 m)   Wt 76.7 kg (169 lb)   LMP  (LMP Unknown)   SpO2 97%   BMI 30.91 kg/m²          Physical Exam  Vitals reviewed. Constitutional:       Appearance: She is well-developed. HENT:      Head: Normocephalic and atraumatic. Right Ear: External ear normal. Tympanic membrane is not erythematous or bulging. Left Ear: External ear normal. Tympanic membrane is not erythematous or bulging. Nose: Nose normal.      Mouth/Throat:      Mouth: No oral lesions. Pharynx: No oropharyngeal exudate. Eyes:      General: No scleral icterus. Right eye: No discharge. Left eye: No discharge. Conjunctiva/sclera: Conjunctivae normal.   Neck:      Thyroid: No thyromegaly. Cardiovascular:      Rate and Rhythm: Normal rate and regular rhythm. Heart sounds: Normal heart sounds. No murmur heard. No friction rub. No gallop. Pulmonary:      Effort: Pulmonary effort is normal. No respiratory distress. Breath sounds: No wheezing or rales. Chest:      Chest wall: No tenderness.    Abdominal: General: Bowel sounds are normal. There is no distension. Palpations: Abdomen is soft. There is no mass. Tenderness: There is no abdominal tenderness. There is no guarding or rebound. Musculoskeletal:         General: No tenderness or deformity. Normal range of motion. Cervical back: Normal range of motion and neck supple. Lymphadenopathy:      Cervical: No cervical adenopathy. Skin:     General: Skin is warm and dry. Coloration: Skin is not pale. Findings: No erythema or rash. Neurological:      Mental Status: She is alert and oriented to person, place, and time. Cranial Nerves: No cranial nerve deficit. Motor: No abnormal muscle tone. Coordination: Coordination normal.      Deep Tendon Reflexes: Reflexes are normal and symmetric.    Psychiatric:         Behavior: Behavior normal.

## 2023-09-09 DIAGNOSIS — E03.9 HYPOTHYROIDISM, UNSPECIFIED TYPE: ICD-10-CM

## 2023-09-11 RX ORDER — LEVOTHYROXINE SODIUM 0.07 MG/1
TABLET ORAL
Qty: 90 TABLET | Refills: 2 | Status: SHIPPED | OUTPATIENT
Start: 2023-09-11

## 2023-10-03 ENCOUNTER — OFFICE VISIT (OUTPATIENT)
Dept: OBGYN CLINIC | Facility: MEDICAL CENTER | Age: 53
End: 2023-10-03
Payer: COMMERCIAL

## 2023-10-03 VITALS
BODY MASS INDEX: 30.91 KG/M2 | DIASTOLIC BLOOD PRESSURE: 60 MMHG | WEIGHT: 168 LBS | HEIGHT: 62 IN | SYSTOLIC BLOOD PRESSURE: 120 MMHG

## 2023-10-03 DIAGNOSIS — Z12.31 ENCOUNTER FOR SCREENING MAMMOGRAM FOR MALIGNANT NEOPLASM OF BREAST: ICD-10-CM

## 2023-10-03 DIAGNOSIS — N95.2 VAGINAL ATROPHY: ICD-10-CM

## 2023-10-03 DIAGNOSIS — N81.4 CYSTOCELE WITH PROLAPSE: Primary | ICD-10-CM

## 2023-10-03 PROCEDURE — 99214 OFFICE O/P EST MOD 30 MIN: CPT | Performed by: OBSTETRICS & GYNECOLOGY

## 2023-10-03 RX ORDER — ESTRADIOL 0.1 MG/G
0.5 CREAM VAGINAL 2 TIMES WEEKLY
Qty: 42.5 G | Refills: 2 | Status: SHIPPED | OUTPATIENT
Start: 2023-10-05

## 2023-10-03 NOTE — PROGRESS NOTES
Assessment/Plan  Diagnoses and all orders for this visit:    Cystocele with prolapse  -     Ambulatory Referral to Physical Therapy; Future  We discussed possible management options , pessary , supportive care , surgical intervention     Encounter for screening mammogram for malignant neoplasm of breast  -     Mammo screening bilateral w 3d & cad; Future    Vaginal atrophy  -     estradiol (ESTRACE VAGINAL) 0.1 mg/g vaginal cream; Insert 0.5 g into the vagina 2 (two) times a week  Risks and benefits reviewed with local HRT use        Jeferson Maza is a 52 y.o. female here for a problem visit. Patient is complaining of feeling tissue past vagina after she has intercourse. This gets better after like 2-3 days after intercourse. Also states has a lot of vaginal dryness  And some discomfort with intercourse    . Patient Active Problem List   Diagnosis   • Hypothyroidism   • Psoriasis   • Atopic dermatitis   • Herpes zoster without complication       Gynecologic History  No LMP recorded (lmp unknown). Patient is postmenopausal.  The current method of family planning is post menopausal status.     Past Medical History:   Diagnosis Date   • GERD (gastroesophageal reflux disease)    • Hypothyroid      Past Surgical History:   Procedure Laterality Date   • LAPAROSCOPY     • TUBAL LIGATION       Family History   Problem Relation Age of Onset   • Cancer Mother 48        Sarcoma   • Uterine cancer Mother 64   • No Known Problems Father    • No Known Problems Daughter    • No Known Problems Maternal Grandmother    • No Known Problems Maternal Grandfather    • No Known Problems Paternal Grandmother    • No Known Problems Paternal Grandfather    • No Known Problems Son    • No Known Problems Son    • No Known Problems Maternal Aunt    • No Known Problems Paternal Aunt    • No Known Problems Paternal Aunt    • No Known Problems Paternal Aunt    • No Known Problems Paternal Aunt      Social History     Socioeconomic History   • Marital status: /Civil Union     Spouse name: Not on file   • Number of children: Not on file   • Years of education: Not on file   • Highest education level: Not on file   Occupational History   • Not on file   Tobacco Use   • Smoking status: Never   • Smokeless tobacco: Never   Vaping Use   • Vaping Use: Never used   Substance and Sexual Activity   • Alcohol use: No   • Drug use: No   • Sexual activity: Yes     Partners: Male     Birth control/protection: Post-menopausal   Other Topics Concern   • Not on file   Social History Narrative    Daily caffeine use- 1 cup of tea     Social Determinants of Health     Financial Resource Strain: Not on file   Food Insecurity: Not on file   Transportation Needs: Not on file   Physical Activity: Not on file   Stress: Not on file   Social Connections: Not on file   Intimate Partner Violence: Not on file   Housing Stability: Not on file     No Known Allergies    Current Outpatient Medications:   •  [START ON 10/5/2023] estradiol (ESTRACE VAGINAL) 0.1 mg/g vaginal cream, Insert 0.5 g into the vagina 2 (two) times a week, Disp: 42.5 g, Rfl: 2  •  levothyroxine 75 mcg tablet, TAKE 1 TABLET BY MOUTH EVERY DAY, Disp: 90 tablet, Rfl: 2  •  albuterol (Ventolin HFA) 90 mcg/act inhaler, Inhale 2 puffs every 6 (six) hours as needed for wheezing (Patient not taking: Reported on 3/2/2023), Disp: 18 g, Rfl: 1  •  omeprazole (PriLOSEC) 40 MG capsule, TAKE 1 CAPSULE BY MOUTH EVERY DAY BEFORE BREAKFAST (Patient not taking: Reported on 7/21/2023), Disp: 30 capsule, Rfl: 3    Review of Systems  Constitutional :no fever, feels well, no tiredness, no recent weight gain or loss  ENT: no ear ache, no loss of hearing, no nosebleeds or nasal discharge, no sore throat or hoarseness. Cardiovascular: no complaints of slow or fast heart beat, no chest pain, no palpitations, no leg claudication or lower extremity edema.   Respiratory: no complaints of shortness of shortness of breath, no MUNIZ  Breasts:no complaints of breast pain, breast lump, or nipple discharge  Gastrointestinal: no complaints of abdominal pain, constipation, nausea, vomiting, or diarrhea or bloody stools  Genitourinary :  as noted in HPI. Musculoskeletal: no complaints of arthralgia, no myalgia, no joint swelling or stiffness, no limb pain or swelling. Integumentary: no complaints of skin rash or lesion, itching or dry skin  Neurological: no complaints of headache, no confusion, no numbness or tingling, no dizziness or fainting     Objective     /60   Ht 5' 2" (1.575 m)   Wt 76.2 kg (168 lb)   LMP  (LMP Unknown)   BMI 30.73 kg/m²     General:   appears stated age, cooperative, alert normal mood and affect   Lungs: Unlabored breathing     Abdomen: soft, non-tender, without masses or organomegaly   Vulva: normal   Vagina: vagina positive for atrophic mucosa/ cystocele and rectocele present     Urethra: normal   Cervix: Normal, no discharge. Uterus: normal size, contour, position, consistency, mobility, non-tender   Adnexa: no mass, fullness, tenderness   Lymphatic palpation of lymph nodes in neck, axilla, groin and/or other locations: no lymphadenopathy or masses noted   Skin normal skin turgor and no rashes.    Psychiatric orientation to person, place, and time: normal. mood and affect: normal   Breast : no palpable masses or skin changes

## 2023-10-20 NOTE — PROGRESS NOTES
PT Evaluation     Today's date: 10/24/2023  Patient name: Paty Parker  : 1970  MRN: [id number]  Referring provider: Eleuterio Selby,*  Dx:   Encounter Diagnosis     ICD-10-CM    1. Cystocele with prolapse  N81.4 Ambulatory Referral to Physical Therapy      2. Rectocele  N81.6       3. Pelvic floor weakness in female  N81.89           Start Time: 7831  Stop Time: 1630  Total time in clinic (min): 45 minutes    Assessment  Assessment details: Magda Juárez is a 52y.o. year old female with complaints of cystocele and rectocele. The following impairments were found on evaluation: PFM weakness, and moderate prolapse of anterior and posterior wall. Patient's presentation is consistent with POP. These impairments contribute to the following functional limitations: decreased tolerance to activity and pain with intercourse; and the following disability: decreased quality of life. Magda Juárez  is a good candidate for therapy and would benefit from skilled physical therapy to address the above impairments in order to allow the patient to achieve below goals and return to her prior level of function. Patient education provided on PFM anatomy and function, exam findings and PFM strengthening. Patient educated on diagnosis, plan of care and prognosis. They are in agreement with recommended plan of care, goals for therapy and demonstrates motivation for active participation in proposed plan of care. Thank you Dr. Robynn Galeazzi for this referral!  Impairments: abnormal coordination, abnormal muscle firing, abnormal or restricted ROM, activity intolerance, impaired physical strength, lacks appropriate home exercise program, poor posture  and poor body mechanics  Understanding of Dx/Px/POC: good   Prognosis: good    Goals  STG to be completed in 8 weeks from 10/24/2023:  1. Magda Juárez will be independent with initial home exercise program.   [address] will demonstrate ability to contract, relax and actively lengthen PFM.    Rafaela will have good pressure management with position changes. [id number] will be independent with double void strategies. LTG to be completed in 12 weeks from 10/24/2023 or upon discharge from PFPT:  1. Raulito King will be independent with advanced home exercise program for self-management of symptoms. [address] will demonstrate ability to appropriately activate deep core muscles with functional mobility tasks. 3. Agustin report 90% improvement or better in prolapse type symptoms. [address] will report being able to participate in vaginal intercourse with no pain to allow patient improved quality of life. [facility] will report no post void dribble after urination. [address] will demonstrate good ability to activate deep core with all functional movements. Plan  Patient would benefit from: skilled physical therapy  Planned modality interventions: biofeedback, cryotherapy, TENS and thermotherapy: hydrocollator packs  Planned therapy interventions: abdominal trunk stabilization, activity modification, ADL retraining, ADL training, balance, balance/weight bearing training, behavior modification, body mechanics training, breathing training, coordination, flexibility, functional ROM exercises, gait training, graded activity, graded exercise, IADL retraining, home exercise program, IASTM, joint mobilization, manual therapy, kinesiology taping, massage, motor coordination training, muscle pump exercises, nerve gliding, neuromuscular re-education, patient education, postural training, strengthening, stretching, therapeutic activities, therapeutic exercise and therapeutic training  Frequency: 1x week  Duration in weeks: 12  Plan of Care beginning date: 10/24/2023  Plan of Care expiration date: 1/16/2024  Treatment plan discussed with: patient, PTA and referring physician        PT Pelvic Floor Subjective:   History of Present Illness:   Tim Alex is a 52 y.o. female who prefers she/her pronouns.  They present to pelvic floor physical therapy with the primary complaint of cystocele. They are referred to OPPT by Dr. Tushar Duggan. Bella Qureshi reports feeling heavyness and increased prolapse protrusion after intercourse. She is using estrogen cream 2x per week- recently recommended by doctor. She is using pessary and is able to take it in and out indepenetly without discomfort. Per OBGYN Note:   "Remy Suh is a 52 y.o. female here for a problem visit. Patient is complaining of feeling tissue past vagina after she has intercourse. This gets better after like 2-3 days after intercourse. Also states has a lot of vaginal dryness  And some discomfort with intercourse     Assessment/Plan  Diagnoses and all orders for this visit:  Cystocele with prolapse  - Ambulatory Referral to Physical Therapy; Future  We discussed possible management options , pessary , supportive care , surgical intervention   Encounter for screening mammogram for malignant neoplasm of breast  -Mammo screening bilateral w 3d & cad; Future   Vaginal atrophy  -estradiol (ESTRACE VAGINAL) 0.1 mg/g vaginal cream; Insert 0.5 g into the vagina 2 (two) times a week"    Social Support:     Lives in:  Multiple-level home    Lives with:  Spouse, adult children and young children    Work status: employed part time (works at [employer])  Diet and Exercise:    Diet:vegetarian    Exercise type: no activity  Co-morbidities:    Patient Active Problem List:     Hypothyroidism     Psoriasis     Atopic dermatitis     Herpes zoster without complication  OB/ gyn History    Gestational History:     Number of prior pregnancies: 4    Number of term pregnancies: 3    Delivery Type: vaginal delivery      Number of vaginal deliveries: 3    Menstrual History:      Irregular menses: LMP 2015.     Birth control: EPIC#2118^NOTE][Form of hormone replacement therapy: vaginal estrogen cream  Bladder Function:     Voiding Difficulties positive for: frequent urination (drinking more recently) and incomplete emptying      Voiding Difficulties negative for: urgency, hesitancy and straining       Voiding Difficulties comments:     Voiding frequency: every 1-2 hours    Urinary leakage: no urine leakage    Urinary leakage aggravated by: coughing (only if she coughs a lot) and key-in-the-door syndrome    Urinary leakage not aggravated by: sneezing, bending, standing up, hearing running water and post-void dribble    Nocturia (episodes per night): 2    Painful urination: No      Intake (ounces): Intake (ounces) comment: 2 cups of tea in the morning with milk   Water   Bowel Function:     Bowel frequency: multiple times a day (2 -3 x per day)    [place] Stool Scale: type 3 and type 7    Stool softener use: no stool softeners  Sexual Function:     Sexually Active:  Sexually active    Pain during intercourse: Yes      Lubrication Use: Yesnot able to acheive orgasmBurning:  Pain:     Current pain ratin    At best pain ratin    At worst pain ratin    Location:  Vulvar/vaginal    Quality:  Burning    Aggravating factors:  [place]  Treatments:     None    Patient Goals:     Patient goals for therapy:  Fully empty bladder or bowels, improved quality of life and improved comfort    Other patient goals:  "after sex, to not have the prolapse come out"      Objective       Abdominal Assessment:      Position: supine exam    Skin inspection:   no scars present. General Perineum Exam:   perineum intact.      Visual Inspection of Perineum:   Excursion of perineal body in cephalad direction with contraction of pelvic floor muscles (PFM): fair   Excursion of perineal body in caudal direction with relaxation of pelvic floor muscles (PFM): fair   Involuntary contraction with coughing: yes (weak)  Cough reflex: cough reflex (weak)    Pelvic Organ Prolapse   Position: hook-lying  At rest: mild and mild  With bearing down: moderate (to the level of hymenal remnants)  Location: anterior and posterior  Perineal body inspection: within normal limits        Pelvic Floor Muscle Exam:      Breathing pattern with contraction: within normal limits   Pelvic floor muscle relaxation is complete. PERFECT Score   Power right: 2/5   Power left: 2/5   Endurance (seconds to max): 5   Repetitions (before fatigue): 10        pelvic floor exam consent given by patient    Pelvic exam completed: vaginally     Graphical documentation:     Burning             Precautions: standard    Access Code: [email or web address]  URL: [email or web address]  Date: 10/24/2023  Prepared by: Juanjose Martin    Exercises  - Seated Pelvic Floor Contraction  - 3 x daily - 1 sets - 10 reps  - Seated Pelvic Floor Contraction with Isometric Hip Adduction  - 1 x daily - 1 sets - 10 reps       10/24/2023          Visit Number: #1 #2 #3 #4 #5 #6 #7 #8   Outcome Measure:           Patient Ed           PFM anatomy and function KH          Pressure management   **         Use of lubricant for intercourse  reviewed          Double void  **                                          Neuro Re-Ed           DB           DB with PFM           DP with PFM with bridge            STS with PFM                                             Ther Ex           Warm-up            Seated PFM contraction + quick flick  5" hold, 5 ficks           PFM contraction with hip add  5" hold           PFM contraction with hip abd            HR with PFM                                             Ther Activity                                 Manual           PFM exam  [id number] [facility]

## 2023-10-24 ENCOUNTER — EVALUATION (OUTPATIENT)
Age: 53
End: 2023-10-24
Payer: COMMERCIAL

## 2023-10-24 DIAGNOSIS — N81.6 RECTOCELE: ICD-10-CM

## 2023-10-24 DIAGNOSIS — N81.4 CYSTOCELE WITH PROLAPSE: Primary | ICD-10-CM

## 2023-10-24 DIAGNOSIS — N81.89 PELVIC FLOOR WEAKNESS IN FEMALE: ICD-10-CM

## 2023-10-24 PROCEDURE — 97530 THERAPEUTIC ACTIVITIES: CPT

## 2023-10-24 PROCEDURE — 97162 PT EVAL MOD COMPLEX 30 MIN: CPT

## 2023-10-27 NOTE — PROGRESS NOTES
Daily Note     Today's date: 10/31/2023  Patient name: Patricia Seals  : 1970  MRN: [id number]  Referring provider: Sandrine Hoffman,*  Dx:   Encounter Diagnosis     ICD-10-CM    1. Cystocele with prolapse  N81.4       2. Rectocele  N81.6       3. Pelvic floor weakness in female  N81.89           Start Time: 4865  Stop Time: 1215  Total time in clinic (min): 50 minutes    Subjective: Pt has nothing new to report. Daughter in law present to help translate. Pt provided verbal consent for internal PFM exam.       Objective: See treatment diary below    Pelvic Floor Muscle Control  PERF-Power Right: 2/5  PERF-Power Left: 2/5  PERF-Endurance:  5 seconds  PERF- Flicks: 5      Assessment: Agustin Lopez tolerated today's treatment session well. Patient education provided on progression of POC and TE. Ramon Meuller completed all TE with good form and no adverse reactions. Pt demonstrated improved activation of PFMs today compared to IE. Practiced with holds and quick flicks. Improved strength of contraction with coordination of breath. Pt had difficulty with coordination of breath, movement and PFMs. Daughter in law had good understanding and was able to help direct patient. Ramon Mueller continues to benefit from skilled OPPT services to address  POP symptoms . Will continue to address functional deficits and focus on progression of POC per patient tolerance. Plan: Continue per plan of care. Progress treatment as tolerated. Precautions: standard    Access Code: [email or web address]  URL: [email or web address]  Date: 10/31/2023  Prepared by: Lanie Gregorio    Exercises  - Seated Pelvic Floor Contraction  - 3 x daily - 1 sets - 10 reps  - Seated Pelvic Floor Contraction with Isometric Hip Adduction  - 1 x daily - 1 sets - 10 reps  - Sit to Stand   - 1 x daily - 1 sets - 10 reps  - Seated Pelvic Floor Contraction with Hip Abduction and Resistance Loop  - 1 x daily - 1 sets - 10 reps  - Heel Raise with Pelvic Floor Contraction   - 1 x daily - 1 sets - 20 reps       10/24/2023 10/31/2023         Visit Number: #1 #2 #3 #4 #5 #6 #7 #8   Outcome Measure:           Patient Ed           PFM anatomy and function [id number] [facility]          Pressure management   Discussed          Use of lubricant for intercourse  reviewed          Double void  Handout provided                                           Neuro Re-Ed           DB           DB with PFM           DP with PFM with bridge            STS with PFM   STS with breath x10                                          Ther Ex           Warm-up            Seated PFM contraction + quick flick  5" hold, 5 ficks           PFM contraction with hip add  5" hold  1 min with 5" hold          PFM contraction with hip abd   GTB 1 min (5" hold)         HR with PFM   1 min (max repeated cues)                                          Ther Activity                                 Manual           PFM exam  [id number] [facility] see above                                                      Modalities

## 2023-10-31 ENCOUNTER — OFFICE VISIT (OUTPATIENT)
Age: 53
End: 2023-10-31
Payer: COMMERCIAL

## 2023-10-31 DIAGNOSIS — N81.89 PELVIC FLOOR WEAKNESS IN FEMALE: ICD-10-CM

## 2023-10-31 DIAGNOSIS — N81.4 CYSTOCELE WITH PROLAPSE: Primary | ICD-10-CM

## 2023-10-31 DIAGNOSIS — N81.6 RECTOCELE: ICD-10-CM

## 2023-10-31 PROCEDURE — 97112 NEUROMUSCULAR REEDUCATION: CPT

## 2023-10-31 PROCEDURE — 97110 THERAPEUTIC EXERCISES: CPT

## 2023-10-31 PROCEDURE — 97140 MANUAL THERAPY 1/> REGIONS: CPT

## 2023-11-07 ENCOUNTER — APPOINTMENT (OUTPATIENT)
Age: 53
End: 2023-11-07
Payer: COMMERCIAL

## 2023-11-07 NOTE — PROGRESS NOTES
Daily Note     Today's date: 2023  Patient name: Taylor Montana  : 1970  MRN: [id number]  Referring provider: Ehsan Greene,*  Dx:   Encounter Diagnosis     ICD-10-CM    1. Cystocele with prolapse  N81.4       2. Rectocele  N81.6       3. Pelvic floor weakness in female  N81.89           Start Time: 1120  Stop Time: 1200  Total time in clinic (min): 40 minutes    Subjective: Pt reports she has been doing all exercises except sit to stand with pelvic floor contractions. History of Present Illness:   Taylor Montana is a 52 y.o. female who prefers she/her pronouns. They present to pelvic floor physical therapy with the primary complaint of cystocele. They are referred to OPPT by Dr. Nehal Aguiar. Steff Gaitan reports feeling heavyness and increased prolapse protrusion after intercourse. She is using estrogen cream 2x per week- recently recommended by doctor. She is using pessary and is able to take it in and out indepenetly without discomfort. Goals  STG to be completed in 8 weeks from 10/24/2023:  1. Steff Gaitan will be independent with initial home exercise program.   [address] will demonstrate ability to contract, relax and actively lengthen PFM. Rafaela will have good pressure management with position changes. [id number] will be independent with double void strategies. LTG to be completed in 12 weeks from 10/24/2023 or upon discharge from PFPT:  1. Steff Gaitan will be independent with advanced home exercise program for self-management of symptoms. [address] will demonstrate ability to appropriately activate deep core muscles with functional mobility tasks. 3. Agustin report 90% improvement or better in prolapse type symptoms. [address] will report being able to participate in vaginal intercourse with no pain to allow patient improved quality of life. [facility] [facility] will report no post void dribble after urination. [address] will demonstrate good ability to activate deep core with all functional movements. Objective: See treatment diary below      Assessment: Lawson Dial tolerated today's treatment session well. Patient education provided on continuation of POC and TE. Harry Arora completed all TE with good form and no adverse reactions. Pt with improved ability to coordinate PFM and breathing with movements today. She is less symptomatic after intercourse and has been successful with using double void strategies. Harry Arora continues to benefit from skilled OPPT services to address  POP symptoms . Will continue to address functional deficits and focus on progression of POC per patient tolerance. Plan: Continue per plan of care. Progress treatment as tolerated. Precautions: standard    Access Code: [email or web address]  URL: [email or web address]  Date: 11/08/2023  Prepared by: Jerald Contreras    Exercises  - Seated Pelvic Floor Contraction  - 3 x daily - 1 sets - 10 reps  - Seated Pelvic Floor Contraction with Isometric Hip Adduction  - 1 x daily - 1 sets - 10 reps  - Supine Diaphragmatic Breathing  - 1 x daily  - Supine Bridge with Pelvic Floor Contraction  - 1 x daily - 1 sets - 10-20 reps  - Sit to Stand   - 1 x daily - 1 sets - 10 reps  - Seated Pelvic Floor Contraction with Hip Abduction and Resistance Loop  - 1 x daily - 1 sets - 10 reps  - Heel Raise with Pelvic Floor Contraction   - 1 x daily - 1 sets - 20 reps  - Step Tap with Pelvic Floor Contraction   - 2 x daily - 2 sets - 10 reps     10/24/2023 10/31/2023 11/8/2023        Visit Number: #1 #2 #3 #4 #5 #6 #7 #8   Outcome Measure:           Patient Ed           PFM anatomy and function KH          Pressure management   Discussed          Use of lubricant for intercourse  reviewed  Having less pain and pressure after intercourse         Double void  Handout provided  Has been doing this - more has been coming out                                          Neuro Re-Ed           DB   2 min         DB with PFM   2 min         DP with PFM with bridge    2 min         STS with PFM   STS with breath x10 STS with PFM and exhale x10         Step tap with PFM   4" step x1 min                               Ther Ex           Warm-up            Seated PFM contraction + quick flick  5" hold, 5 ficks           PFM contraction with hip add  5" hold  1 min with 5" hold  2 min with 5" hold         PFM contraction with hip abd   GTB 1 min (5" hold)         HR with PFM   1 min (max repeated cues) 1 min                                          Ther Activity                                 Manual           PFM exam  [id number] [facility] see above                                                      Modalities

## 2023-11-08 ENCOUNTER — OFFICE VISIT (OUTPATIENT)
Age: 53
End: 2023-11-08
Payer: COMMERCIAL

## 2023-11-08 DIAGNOSIS — N81.89 PELVIC FLOOR WEAKNESS IN FEMALE: ICD-10-CM

## 2023-11-08 DIAGNOSIS — N81.6 RECTOCELE: ICD-10-CM

## 2023-11-08 DIAGNOSIS — N81.4 CYSTOCELE WITH PROLAPSE: Primary | ICD-10-CM

## 2023-11-08 PROCEDURE — 97530 THERAPEUTIC ACTIVITIES: CPT

## 2023-11-08 PROCEDURE — 97112 NEUROMUSCULAR REEDUCATION: CPT

## 2023-11-14 ENCOUNTER — APPOINTMENT (OUTPATIENT)
Age: 53
End: 2023-11-14
Payer: COMMERCIAL

## 2023-11-15 ENCOUNTER — APPOINTMENT (OUTPATIENT)
Age: 53
End: 2023-11-15
Payer: COMMERCIAL

## 2023-11-20 NOTE — PROGRESS NOTES
Daily Note     Today's date: 2023  Patient name: Patricia Seals  : 1970  MRN: [id number]  Referring provider: Sandrine Hoffman,*  Dx:   Encounter Diagnosis     ICD-10-CM    1. Cystocele with prolapse  N81.4       2. Rectocele  N81.6       3. Pelvic floor weakness in female  N81.89           Start Time: 1130  Stop Time: 1210  Total time in clinic (min): 40 minutes    Subjective: Pt reports she is feeling good. Has not had pain. She has not been keeping up with exercises consistently. Pt reports not having any feelings of pressure or heaviness at end of the day. She is getting some pain with sex still. She is using estrogen cream 2 days per week consistently. History of Present Illness:   Patricia Seals is a 52 y.o. female who prefers she/her pronouns. They present to pelvic floor physical therapy with the primary complaint of cystocele. They are referred to OPPT by Dr. Mary Carmen Mendez. Ramon Mueller reports feeling heavyness and increased prolapse protrusion after intercourse. She is using estrogen cream 2x per week- recently recommended by doctor. She is using pessary and is able to take it in and out indepenetly without discomfort. Goals  STG to be completed in 8 weeks from 10/24/2023:  1. Ramon Mueller will be independent with initial home exercise program. MET 2023   2. Ramon Mueller will demonstrate ability to contract, relax and actively lengthen PFM. MET 2023   3. Ramon Mueller will have good pressure management with position changes. MET 2023   4. Ramon Mueller will be independent with double void strategies. MET 2023      LTG to be completed in 12 weeks from 10/24/2023 or upon discharge from PFPT:  1. Ramon Mueller will be independent with advanced home exercise program for self-management of symptoms. PROGRESSING 2023   2. Ramon Mueller will demonstrate ability to appropriately activate deep core muscles with functional mobility tasks. PROGRESSING 2023   3.  Ramon Mueller report 90% improvement or better in prolapse type symptoms. PROGRESSING 11/22/2023   4. Ama [place] will report being able to participate in vaginal intercourse with no pain to allow patient improved quality of life. PROGRESSING 11/22/2023 (less pain per pt report)  5. Ama [place] will report no post void dribble after urination. PROGRESSING (pt reports this occurs 30%) 11/22/2023   6. Ama [place] will demonstrate good ability to activate deep core with all functional movements. PROGRESSING 11/22/2023       Objective: See treatment diary below      Assessment: Annett Brunner tolerated today's treatment session well. Patient education provided on continuation of POC and TE. Ama Washington completed all TE with good form and no adverse reactions. Pt had some knee discomfort with STS- discussed hip strengthening and correct form to prevent hip add with STS. Progressed PFM strengthening. Pt going [facility] for next month. Will place on hold and if we do not hear from her in about a month will d/c. Pt agreeable. Ama Washington continues to benefit from skilled OPPT services to address  POP symptoms . Will continue to address functional deficits and focus on progression of POC per patient tolerance. Plan: Continue per plan of care. Progress treatment as tolerated. Precautions: standard    Access Code: [email or web address]  URL: [email or web address]  Date: 11/22/2023  Prepared by: Angle Morrison    Exercises  - Seated Pelvic Floor Contraction  - 3 x daily - 1 sets - 10 reps  - Seated Pelvic Floor Contraction with Isometric Hip Adduction  - 1 x daily - 1 sets - 10 reps  - Supine Diaphragmatic Breathing  - 1 x daily  - Supine Bridge with Pelvic Floor Contraction  - 1 x daily - 1 sets - 10-20 reps  - Sit to Stand   - 1 x daily - 1 sets - 10 reps  - Seated Pelvic Floor Contraction with Hip Abduction and Resistance Loop  - 1 x daily - 1 sets - 10 reps  - Heel Raise with Pelvic Floor Contraction   - 1 x daily - 1 sets - 20 reps  - Step Tap with Pelvic Floor Contraction   - 2 x daily - 2 sets - 10 reps  - Straight Leg Raise with Core Activation   - 1 x daily - 2 sets - 10 reps  - Standing Hip Abduction with Counter Support  - 2 x daily - 2 sets - 10 reps     10/24/2023 10/31/2023 11/8/2023 11/22/2023       Visit Number: #1 #2 #3 #4 #5 #6 #7 #8   Outcome Measure:           Patient Ed           PFM anatomy and function KH          Pressure management   Discussed          Use of lubricant for intercourse  reviewed  Having less pain and pressure after intercourse         Double void  Handout provided  Has been doing this - more has been coming out                                          Neuro Re-Ed           DB   2 min         DB with PFM   2 min  2 min        DP with PFM with bridge    2 min  2 min        STS with PFM   STS with breath x10 STS with PFM and exhale x10  X15 (cues to prevent hip adduction)       Step tap with PFM   4" step x1 min  1 min        SLR with PFM contraction     X10 each LE                   Ther Ex           Warm-up            Seated PFM contraction + quick flick  5" hold, 5 ficks           PFM contraction with hip add  5" hold  1 min with 5" hold  2 min with 5" hold  2 min with 7" hold        PFM contraction with hip abd   GTB 1 min (5" hold)         HR with PFM   1 min (max repeated cues) 1 min  x15       Standing hip abd     X15 each LE                              Ther Activity                                 Manual           PFM exam  [id number] [facility] see above                                                      Modalities

## 2023-11-21 ENCOUNTER — APPOINTMENT (OUTPATIENT)
Age: 53
End: 2023-11-21
Payer: COMMERCIAL

## 2023-11-22 ENCOUNTER — OFFICE VISIT (OUTPATIENT)
Age: 53
End: 2023-11-22
Payer: COMMERCIAL

## 2023-11-22 DIAGNOSIS — N81.89 PELVIC FLOOR WEAKNESS IN FEMALE: ICD-10-CM

## 2023-11-22 DIAGNOSIS — N81.4 CYSTOCELE WITH PROLAPSE: Primary | ICD-10-CM

## 2023-11-22 DIAGNOSIS — N81.6 RECTOCELE: ICD-10-CM

## 2023-11-22 PROCEDURE — 97112 NEUROMUSCULAR REEDUCATION: CPT

## 2023-11-22 PROCEDURE — 97110 THERAPEUTIC EXERCISES: CPT

## 2023-12-01 ENCOUNTER — HOSPITAL ENCOUNTER (OUTPATIENT)
Dept: MAMMOGRAPHY | Facility: MEDICAL CENTER | Age: 53
Discharge: HOME | End: 2023-12-01
Payer: COMMERCIAL

## 2023-12-01 VITALS — BODY MASS INDEX: 30.91 KG/M2 | HEIGHT: 62 IN | WEIGHT: 168 LBS

## 2023-12-01 DIAGNOSIS — Z12.31 ENCOUNTER FOR SCREENING MAMMOGRAM FOR MALIGNANT NEOPLASM OF BREAST: ICD-10-CM

## 2023-12-01 PROCEDURE — 77063 BREAST TOMOSYNTHESIS BI: CPT

## 2023-12-01 PROCEDURE — 77067 SCR MAMMO BI INCL CAD: CPT

## 2024-01-26 ENCOUNTER — OFFICE VISIT (OUTPATIENT)
Dept: FAMILY MEDICINE CLINIC | Facility: CLINIC | Age: 54
End: 2024-01-26
Payer: COMMERCIAL

## 2024-01-26 VITALS
TEMPERATURE: 97 F | BODY MASS INDEX: 30.73 KG/M2 | HEART RATE: 80 BPM | WEIGHT: 167 LBS | DIASTOLIC BLOOD PRESSURE: 64 MMHG | SYSTOLIC BLOOD PRESSURE: 110 MMHG | HEIGHT: 62 IN | OXYGEN SATURATION: 99 %

## 2024-01-26 DIAGNOSIS — R69 SICK: ICD-10-CM

## 2024-01-26 DIAGNOSIS — J40 BRONCHITIS: Primary | ICD-10-CM

## 2024-01-26 LAB
SARS-COV-2 AG UPPER RESP QL IA: NEGATIVE
SL AMB POCT RAPID FLU A: NORMAL
SL AMB POCT RAPID FLU B: NORMAL
VALID CONTROL: NORMAL

## 2024-01-26 PROCEDURE — 87804 INFLUENZA ASSAY W/OPTIC: CPT | Performed by: FAMILY MEDICINE

## 2024-01-26 PROCEDURE — 99213 OFFICE O/P EST LOW 20 MIN: CPT | Performed by: FAMILY MEDICINE

## 2024-01-26 PROCEDURE — 87811 SARS-COV-2 COVID19 W/OPTIC: CPT | Performed by: FAMILY MEDICINE

## 2024-01-26 RX ORDER — PREDNISONE 20 MG/1
40 TABLET ORAL DAILY
Qty: 10 TABLET | Refills: 0 | Status: SHIPPED | OUTPATIENT
Start: 2024-01-26 | End: 2024-01-31

## 2024-01-26 RX ORDER — ALBUTEROL SULFATE 90 UG/1
2 AEROSOL, METERED RESPIRATORY (INHALATION) EVERY 6 HOURS PRN
Qty: 18 G | Refills: 1 | Status: SHIPPED | OUTPATIENT
Start: 2024-01-26

## 2024-01-26 RX ORDER — AZITHROMYCIN 250 MG/1
TABLET, FILM COATED ORAL DAILY
Qty: 6 TABLET | Refills: 0 | Status: SHIPPED | OUTPATIENT
Start: 2024-01-26 | End: 2024-01-31

## 2024-01-26 RX ORDER — BENZONATATE 200 MG/1
200 CAPSULE ORAL 3 TIMES DAILY PRN
Qty: 20 CAPSULE | Refills: 0 | Status: SHIPPED | OUTPATIENT
Start: 2024-01-26

## 2024-01-26 NOTE — ASSESSMENT & PLAN NOTE
Patient with signs and symptoms of asthma exacerbation in the setting of possible bronchitis.  Rapid COVID and flu testing are negative at today's visit.    Mentation or as needed albuterol inhaler use refill is provided.  Should this not improve symptoms of wheezing recommend 5-day course of prednisone 40 mg daily Rx provided.  Perles are provided for as needed symptomatic relief of cough.  Advanced prescription for 5-day azithromycin course is provided should symptoms persist over the next 3 to 5 days.

## 2024-01-26 NOTE — PROGRESS NOTES
Family Medicine Follow-Up Office Visit  Agustin Lopez 53 y.o. female   MRN: [id number] : 1970  ENCOUNTER: 2024       Assessment and Plan   1. Bronchitis  Assessment & Plan:  Patient with signs and symptoms of asthma exacerbation in the setting of possible bronchitis.  Rapid COVID and flu testing are negative at today's visit.    Mentation or as needed albuterol inhaler use refill is provided.  Should this not improve symptoms of wheezing recommend 5-day course of prednisone 40 mg daily Rx provided.  Perles are provided for as needed symptomatic relief of cough.  Advanced prescription for 5-day azithromycin course is provided should symptoms persist over the next 3 to 5 days.    Orders:  -     albuterol (Ventolin HFA) 90 mcg/act inhaler; Inhale 2 puffs every 6 (six) hours as needed for wheezing  -     predniSONE 20 mg tablet; Take 2 tablets (40 mg total) by mouth daily for 5 days  -     benzonatate (TESSALON) 200 MG capsule; Take 1 capsule (200 mg total) by mouth 3 (three) times a day as needed for cough  -     azithromycin (Zithromax) 250 mg tablet; Take 2 tablets (500 mg total) by mouth daily for 1 day, THEN 1 tablet (250 mg total) daily for 4 days.    2. Sick  -     POCT Rapid Covid Ag  -     POCT rapid flu A and B             Chief Complaint     Chief Complaint   Patient presents with   • Cough   • Earache   • Fatigue   • Headache   • Sore Throat   • Sinus Problem   • Chills   • Chest Pain     From coughing       History of Present Illness   Agustin Lopez is a 53 y.o.-year-old female with past medical history of hypothyroidism, psoriasis who presents today for evaluation for symptoms of upper respiratory illness.    Patient notes that she started approximately 1 week ago with symptoms of fatigue, congestion, and eventually coughing.  She notes associated chills without fever.  Notes pressure through her frontal sinuses as well as associated headache.  Notes that the symptoms have persisted without  "improvement      Review of Systems   Review of Systems   Constitutional:  Positive for chills and fatigue. Negative for fever.   HENT:  Positive for congestion. Negative for rhinorrhea and sore throat.    Respiratory:  Positive for cough and chest tightness. Negative for shortness of breath.    Cardiovascular:  Negative for chest pain and palpitations.   Gastrointestinal:  Negative for abdominal pain, constipation, diarrhea, nausea and vomiting.   Neurological:  Positive for headaches.       Active Problem List     Patient Active Problem List   Diagnosis   • Hypothyroidism   • Psoriasis   • Atopic dermatitis   • Herpes zoster without complication   • Bronchitis       Past Medical History, Past Surgical History, Family History, and Social History were reviewed and updated today as appropriate.    Objective   /64 (BP Location: Left arm, Patient Position: Sitting, Cuff Size: Standard)   Pulse 80   Temp (!) 97 °F (36.1 °C) (Tympanic)   Ht 5' 2\" (1.575 m)   Wt 75.8 kg (167 lb)   LMP  (LMP Unknown)   SpO2 99%   BMI 30.54 kg/m²     Physical Exam  Vitals reviewed.   Constitutional:       General: She is not in acute distress.     Appearance: She is well-developed. She is not ill-appearing.   HENT:      Head: Normocephalic and atraumatic.      Right Ear: Tympanic membrane, ear canal and external ear normal.      Left Ear: Tympanic membrane, ear canal and external ear normal.      Nose: Congestion present.      Mouth/Throat:      Mouth: Mucous membranes are moist.      Pharynx: Oropharynx is clear.   Eyes:      General: No scleral icterus.     Conjunctiva/sclera: Conjunctivae normal.   Cardiovascular:      Rate and Rhythm: Normal rate and regular rhythm.      Heart sounds: Normal heart sounds.   Pulmonary:      Effort: Pulmonary effort is normal. No respiratory distress.      Breath sounds: Wheezing present.   Abdominal:      General: Bowel sounds are normal. There is no distension.      Palpations: Abdomen is " "soft.      Tenderness: There is no abdominal tenderness.   Musculoskeletal:      Right lower leg: No edema.      Left lower leg: No edema.   Skin:     General: Skin is warm and dry.   Neurological:      General: No focal deficit present.      Mental Status: She is alert and oriented to person, place, and time.   Psychiatric:         Mood and Affect: Mood normal.         Behavior: Behavior normal.         Pertinent Laboratory/Diagnostic Studies:  Lab Results   Component Value Date    GLUCOSE 89 07/09/2015    BUN 13 04/27/2022    CREATININE 0.99 04/27/2022    CALCIUM 10.0 04/27/2022     11/14/2017    K 5.4 (H) 04/27/2022    CO2 29 04/27/2022     (H) 04/27/2022     Lab Results   Component Value Date    ALT 31 04/27/2022    AST 21 04/27/2022    ALKPHOS 90 04/27/2022    BILITOT 0.4 11/14/2017       Lab Results   Component Value Date    WBC 5.73 06/17/2023    HGB 10.5 (L) 06/17/2023    HCT 34.9 06/17/2023    MCV 66 (L) 06/17/2023     06/17/2023       No results found for: \"TSH\"    Lab Results   Component Value Date    CHOL 183 09/28/2016     Lab Results   Component Value Date    TRIG 99 04/27/2022     Lab Results   Component Value Date    HDL 86 04/27/2022     Lab Results   Component Value Date    LDLCALC 78 04/27/2022     Lab Results   Component Value Date    HGBA1C 6.1 (H) 04/27/2022       Results for orders placed or performed in visit on 01/26/24   POCT Rapid Covid Ag   Result Value Ref Range    POCT SARS-CoV-2 Ag Negative Negative    VALID CONTROL Valid    POCT rapid flu A and B   Result Value Ref Range    RAPID FLU A neg     RAPID FLU B neg        Orders Placed This Encounter   Procedures   • POCT Rapid Covid Ag   • POCT rapid flu A and B         Current Medications     Current Outpatient Medications   Medication Sig Dispense Refill   • albuterol (Ventolin HFA) 90 mcg/act inhaler Inhale 2 puffs every 6 (six) hours as needed for wheezing 18 g 1   • azithromycin (Zithromax) 250 mg tablet Take 2 " tablets (500 mg total) by mouth daily for 1 day, THEN 1 tablet (250 mg total) daily for 4 days. 6 tablet 0   • benzonatate (TESSALON) 200 MG capsule Take 1 capsule (200 mg total) by mouth 3 (three) times a day as needed for cough 20 capsule 0   • estradiol (ESTRACE VAGINAL) 0.1 mg/g vaginal cream Insert 0.5 g into the vagina 2 (two) times a week 42.5 g 2   • levothyroxine 75 mcg tablet TAKE 1 TABLET BY MOUTH EVERY DAY 90 tablet 2   • omeprazole (PriLOSEC) 40 MG capsule TAKE 1 CAPSULE BY MOUTH EVERY DAY BEFORE BREAKFAST 30 capsule 3   • predniSONE 20 mg tablet Take 2 tablets (40 mg total) by mouth daily for 5 days 10 tablet 0     No current facility-administered medications for this visit.       ALLERGIES:  No Known Allergies    Health Maintenance     Health Maintenance   Topic Date Due   • DTaP,Tdap,and Td Vaccines (1 - Tdap) Never done   • Zoster Vaccine (1 of 2) Never done   • Annual Physical  04/21/2023   • Influenza Vaccine (1) 09/01/2023   • COVID-19 Vaccine (3 - 2023-24 season) 09/01/2023   • Depression Screening  07/17/2024   • Colorectal Cancer Screening  06/16/2025   • Breast Cancer Screening: Mammogram  12/01/2025   • Cervical Cancer Screening  02/17/2027   • HIV Screening  Completed   • Hepatitis C Screening  Completed   • Pneumococcal Vaccine: Pediatrics (0 to 5 Years) and At-Risk Patients (6 to 64 Years)  Aged Out   • HIB Vaccine  Aged Out   • IPV Vaccine  Aged Out   • Hepatitis A Vaccine  Aged Out   • Meningococcal ACWY Vaccine  Aged Out   • HPV Vaccine  Aged Out     Immunization History   Administered Date(s) Administered   • COVID-19 PFIZER VACCINE 0.3 ML IM 11/27/2021   • COVID-19 Pfizer Vac BIVALENT Kenneth-sucrose 12 Yr+ IM 12/27/2022   • Influenza Quadrivalent, 6-35 Months IM 10/11/2016, 11/14/2017   • Influenza, recombinant, quadrivalent,injectable, preservative free 12/17/2018, 11/26/2019, 11/03/2020         Araceli Valencia DO   [facility]  1/26/2024  1:32 PM    Parts of this  note were dictated using Dragon dictation software and may have sounds-like errors due to variation in pronunciation.

## 2024-03-05 ENCOUNTER — ANNUAL EXAM (OUTPATIENT)
Dept: OBGYN CLINIC | Facility: MEDICAL CENTER | Age: 54
End: 2024-03-05
Payer: COMMERCIAL

## 2024-03-05 VITALS
SYSTOLIC BLOOD PRESSURE: 116 MMHG | HEIGHT: 62 IN | DIASTOLIC BLOOD PRESSURE: 70 MMHG | BODY MASS INDEX: 29.63 KG/M2 | WEIGHT: 161 LBS

## 2024-03-05 DIAGNOSIS — Z12.31 ENCOUNTER FOR SCREENING MAMMOGRAM FOR MALIGNANT NEOPLASM OF BREAST: Primary | ICD-10-CM

## 2024-03-05 DIAGNOSIS — Z01.419 ENCOUNTER FOR GYNECOLOGICAL EXAMINATION: Primary | ICD-10-CM

## 2024-03-05 DIAGNOSIS — N81.4 CYSTOCELE WITH PROLAPSE: ICD-10-CM

## 2024-03-05 PROCEDURE — S0612 ANNUAL GYNECOLOGICAL EXAMINA: HCPCS | Performed by: OBSTETRICS & GYNECOLOGY

## 2024-03-05 NOTE — PROGRESS NOTES
ASSESSMENT & PLAN: Agustin Lopez is a 53 y.o.  with normal gynecologic exam.    1.  Routine well woman exam done today  2.  Pap and HPV:  The patient's last pap and hpv was .    It was normal.    Pap with cotesting was not done today.    Current ASCCP Guidelines reviewed.   3.  Mammogram ordered  4.  Colorectal cancer screening was not ordered.  5. The following were reviewed in today's visit: breast self exam, mammography screening ordered, menopause, exercise, and healthy diet  6. Cystocele with mild prolapse  - we discussed management options ,interested in trial of  pessary  ,will return for fitting    7. Vaginal atrophy - needs refill on estrace cream   CC:  Annual Gynecologic Examination    HPI: Agustin Lopez is a 53 y.o.  who presents for annual gynecologic examination.  She has the following concerns:  needs estrace refill  , feel bulge in vagina specially with heavy lifting       Health Maintenance:    She wears her seatbelt routinely.    She does perform regular monthly self breast exams.    She feels safe at home.   Past Medical History:   Diagnosis Date    GERD (gastroesophageal reflux disease)     Hypothyroid        Past Surgical History:   Procedure Laterality Date    LAPAROSCOPY      TUBAL LIGATION         Past OB/Gyn History:  OB History          5    Para   4    Term   3       1    AB   1    Living   3         SAB   1    IAB        Ectopic        Multiple        Live Births   3                   Family History   Problem Relation Age of Onset    Cancer Mother 50        Sarcoma    Uterine cancer Mother 56    No Known Problems Father     No Known Problems Daughter     No Known Problems Maternal Grandmother     No Known Problems Maternal Grandfather     No Known Problems Paternal Grandmother     No Known Problems Paternal Grandfather     No Known Problems Son     No Known Problems Son     No Known Problems Maternal Aunt     No Known Problems Paternal Aunt     No Known Problems  Paternal Aunt     No Known Problems Paternal Aunt     No Known Problems Paternal Aunt        Social History:  Social History     Socioeconomic History    Marital status: /Civil Union     Spouse name: Not on file    Number of children: Not on file    Years of education: Not on file    Highest education level: Not on file   Occupational History    Not on file   Tobacco Use    Smoking status: Never    Smokeless tobacco: Never   Vaping Use    Vaping status: Never Used   Substance and Sexual Activity    Alcohol use: No    Drug use: No    Sexual activity: Yes     Partners: Male     Birth control/protection: Post-menopausal   Other Topics Concern    Not on file   Social History Narrative    Daily caffeine use- 1 cup of tea     Social Determinants of Health     Financial Resource Strain: Not on file   Food Insecurity: Not on file   Transportation Needs: Not on file   Physical Activity: Not on file   Stress: Not on file   Social Connections: Not on file   Intimate Partner Violence: Not on file   Housing Stability: Not on file     No Known Allergies    Current Outpatient Medications:     albuterol (Ventolin HFA) 90 mcg/act inhaler, Inhale 2 puffs every 6 (six) hours as needed for wheezing, Disp: 18 g, Rfl: 1    benzonatate (TESSALON) 200 MG capsule, Take 1 capsule (200 mg total) by mouth 3 (three) times a day as needed for cough, Disp: 20 capsule, Rfl: 0    levothyroxine 75 mcg tablet, TAKE 1 TABLET BY MOUTH EVERY DAY, Disp: 90 tablet, Rfl: 2    omeprazole (PriLOSEC) 40 MG capsule, TAKE 1 CAPSULE BY MOUTH EVERY DAY BEFORE BREAKFAST, Disp: 30 capsule, Rfl: 3    estradiol (ESTRACE VAGINAL) 0.1 mg/g vaginal cream, Insert 0.5 g into the vagina 2 (two) times a week (Patient not taking: Reported on 3/5/2024), Disp: 42.5 g, Rfl: 2      Review of Systems  Constitutional :no fever, feels well, no tiredness, no recent weight gain or loss  ENT: no ear ache, no loss of hearing, no nosebleeds or nasal discharge, no sore throat or  "hoarseness.  Cardiovascular: no complaints of slow or fast heart beat, no chest pain, no palpitations, no leg claudication or lower extremity edema.  Respiratory: no complaints of shortness of shortness of breath, no MUNIZ  Breasts:no complaints of breast pain, breast lump, or nipple discharge  Gastrointestinal: no complaints of abdominal pain, constipation, nausea, vomiting, or diarrhea or bloody stools  Genitourinary : no complaints of dysuria, incontinence, pelvic pain, no dysmenorrhea, vaginal discharge or abnormal vaginal bleeding and as noted in HPI.  Musculoskeletal: no complaints of arthralgia, no myalgia, no joint swelling or stiffness, no limb pain or swelling.  Integumentary: no complaints of skin rash or lesion, itching or dry skin  Neurological: no complaints of headache, no confusion, no numbness or tingling, no dizziness or fainting    Objective      /70   Ht 5' 2\" (1.575 m)   Wt 73 kg (161 lb)   LMP  (LMP Unknown)   BMI 29.45 kg/m²     General:   appears stated age, cooperative, alert normal mood and affect   Lungs: Unlabored breathing     Breasts: normal appearance, no masses or tenderness, Inspection negative, No nipple retraction or dimpling, No nipple discharge or bleeding, No axillary or supraclavicular adenopathy, Normal to palpation without dominant masses   Abdomen: soft, non-tender, without masses or organomegaly   Vulva: normal, normal female genitalia, Bartholin's, Urethra, [place] normal, no lesions, normal female hair distribution, no clitoral enlargement   Vagina: normal vagina, no discharge, exudate, lesion, or erythema   Urethra: normal   Cervix: Normal, no discharge. Nontender.   Uterus: normal size, contour, position, consistency, mobility, non-tender cystocele present with small prolapse     Adnexa: no mass, fullness, tenderness   Lymphatic palpation of lymph nodes in neck, axilla, groin and/or other locations: no lymphadenopathy or masses noted   Skin normal skin turgor and " no rashes.   Psychiatric orientation to person, place, and time: normal. mood and affect: normal

## 2024-06-21 DIAGNOSIS — E03.9 HYPOTHYROIDISM, UNSPECIFIED TYPE: ICD-10-CM

## 2024-06-21 RX ORDER — LEVOTHYROXINE SODIUM 0.07 MG/1
TABLET ORAL
Qty: 90 TABLET | Refills: 2 | Status: SHIPPED | OUTPATIENT
Start: 2024-06-21

## 2024-06-21 NOTE — TELEPHONE ENCOUNTER
Name from pharmacy: LEVOTHYROXINE 75 MCG TABLET          Will file in chart as: levothyroxine 75 mcg tablet    Sig: TAKE 1 TABLET BY MOUTH EVERY DAY    Disp: 90 tablet    Refills: 2    Start: 6/21/2024    Class: Normal    Non-formulary For: Hypothyroidism, unspecified type    Last ordered: 9 months ago (9/11/2023) by Manish Wayne DO    Last refill: 3/18/2024    Rx #: [id number]    Endocrinology:  Hypothyroid Agents [id number]/2024 02:02 AM   Protocol Details TSH within 360 days    Valid encounter within last 12 months      To be filled at: [id number] - [place] - [address]

## 2024-08-21 ENCOUNTER — OFFICE VISIT (OUTPATIENT)
Dept: FAMILY MEDICINE CLINIC | Facility: CLINIC | Age: 54
End: 2024-08-21
Payer: COMMERCIAL

## 2024-08-21 VITALS
HEART RATE: 69 BPM | HEIGHT: 62 IN | SYSTOLIC BLOOD PRESSURE: 118 MMHG | BODY MASS INDEX: 31.1 KG/M2 | WEIGHT: 169 LBS | OXYGEN SATURATION: 99 % | DIASTOLIC BLOOD PRESSURE: 80 MMHG

## 2024-08-21 DIAGNOSIS — L40.9 PSORIASIS: Primary | ICD-10-CM

## 2024-08-21 PROCEDURE — 99214 OFFICE O/P EST MOD 30 MIN: CPT | Performed by: FAMILY MEDICINE

## 2024-08-21 RX ORDER — BETAMETHASONE DIPROPIONATE 0.5 MG/G
CREAM TOPICAL 2 TIMES DAILY
Qty: 45 G | Refills: 1 | Status: SHIPPED | OUTPATIENT
Start: 2024-08-21

## 2024-08-21 NOTE — PROGRESS NOTES
"Assessment/Plan: Patient with chronic psoriasis.  Recommend dermatology evaluation as she has a recent flare.  Recommend use of steroid cream as noted below.  She is aware that sometimes after use of steroid creams psoriasis can reflare.  We discussed other treatment options including immunosuppressant medication and she may wish to discuss this further with dermatology.  Case was discussed with patient and .  Referral to dermatology provided.  Recommend return to office in the interim if any worsening symptoms or new symptoms.     1. Psoriasis  -     Ambulatory Referral to Dermatology; Future  -     betamethasone dipropionate (DIPROSONE) 0.05 % cream; Apply topically 2 (two) times a day        Subjective:      Patient ID: Agustin Lopez is a 53 y.o. female.    Rash             The following portions of the patient's history were reviewed and updated as appropriate: allergies, current medications, past family history, past medical history, past social history, past surgical history, and problem list.    Review of Systems   Constitutional: Negative.    HENT: Negative.     Eyes: Negative.    Respiratory: Negative.     Cardiovascular: Negative.    Gastrointestinal: Negative.    Endocrine: Negative.    Genitourinary: Negative.    Musculoskeletal: Negative.    Skin:  Positive for rash.   Allergic/Immunologic: Negative.    Neurological: Negative.    Hematological: Negative.    Psychiatric/Behavioral: Negative.           Objective:      /80 (BP Location: Left arm, Patient Position: Sitting)   Pulse 69   Ht 5' 2\" (1.575 m)   Wt 76.7 kg (169 lb)   LMP  (LMP Unknown)   SpO2 99%   BMI 30.91 kg/m²          Physical Exam  Vitals reviewed.   Constitutional:       Appearance: She is well-developed.   HENT:      Head: Normocephalic and atraumatic.      Right Ear: External ear normal. Tympanic membrane is not erythematous or bulging.      Left Ear: External ear normal. Tympanic membrane is not erythematous or bulging. "      Nose: Nose normal.      Mouth/Throat:      Mouth: No oral lesions.      Pharynx: No oropharyngeal exudate.   Eyes:      General: No scleral icterus.        Right eye: No discharge.         Left eye: No discharge.      Conjunctiva/sclera: Conjunctivae normal.   Neck:      Thyroid: No thyromegaly.   Cardiovascular:      Rate and Rhythm: Normal rate and regular rhythm.      Heart sounds: Normal heart sounds. No murmur heard.     No friction rub. No gallop.   Pulmonary:      Effort: Pulmonary effort is normal. No respiratory distress.      Breath sounds: No wheezing or rales.   Chest:      Chest wall: No tenderness.   Abdominal:      General: Bowel sounds are normal. There is no distension.      Palpations: Abdomen is soft. There is no mass.      Tenderness: There is no abdominal tenderness. There is no guarding or rebound.   Musculoskeletal:         General: No tenderness or deformity. Normal range of motion.      Cervical back: Normal range of motion and neck supple.   Lymphadenopathy:      Cervical: No cervical adenopathy.   Skin:     General: Skin is warm and dry.      Coloration: Skin is not pale.      Findings: Rash (Patchy psoriatic rash to anterior right lower extremity and right knee.) present. No erythema.   Neurological:      Mental Status: She is alert and oriented to person, place, and time.      Cranial Nerves: No cranial nerve deficit.      Motor: No abnormal muscle tone.      Coordination: Coordination normal.      Deep Tendon Reflexes: Reflexes are normal and symmetric.   Psychiatric:         Behavior: Behavior normal.

## 2024-09-05 ENCOUNTER — TELEPHONE (OUTPATIENT)
Age: 54
End: 2024-09-05

## 2024-09-05 NOTE — TELEPHONE ENCOUNTER
Received call from patient requesting new patient appt,    Patient has a referral with priority of ASAP for Psoriasis.    Patient was offered the next available in 2025.    Patient declined stating she will call back.

## 2025-03-26 DIAGNOSIS — E03.9 HYPOTHYROIDISM, UNSPECIFIED TYPE: ICD-10-CM

## 2025-03-27 RX ORDER — LEVOTHYROXINE SODIUM 75 UG/1
75 TABLET ORAL DAILY
Qty: 90 TABLET | Refills: 2 | Status: SHIPPED | OUTPATIENT
Start: 2025-03-27

## 2025-05-13 ENCOUNTER — ANNUAL EXAM (OUTPATIENT)
Dept: OBGYN CLINIC | Facility: CLINIC | Age: 55
End: 2025-05-13
Payer: COMMERCIAL

## 2025-05-13 VITALS
SYSTOLIC BLOOD PRESSURE: 116 MMHG | HEIGHT: 62 IN | BODY MASS INDEX: 31.28 KG/M2 | WEIGHT: 170 LBS | DIASTOLIC BLOOD PRESSURE: 66 MMHG

## 2025-05-13 DIAGNOSIS — Z01.419 ENCOUNTER FOR WELL WOMAN EXAM WITH ROUTINE GYNECOLOGICAL EXAM: Primary | ICD-10-CM

## 2025-05-13 DIAGNOSIS — Z12.11 SCREENING FOR COLON CANCER: ICD-10-CM

## 2025-05-13 DIAGNOSIS — Z12.31 ENCOUNTER FOR SCREENING MAMMOGRAM FOR MALIGNANT NEOPLASM OF BREAST: ICD-10-CM

## 2025-05-13 DIAGNOSIS — N81.4 UTEROVAGINAL PROLAPSE: ICD-10-CM

## 2025-05-13 PROCEDURE — S0612 ANNUAL GYNECOLOGICAL EXAMINA: HCPCS | Performed by: OBSTETRICS & GYNECOLOGY

## 2025-05-13 PROCEDURE — G0145 SCR C/V CYTO,THINLAYER,RESCR: HCPCS | Performed by: OBSTETRICS & GYNECOLOGY

## 2025-05-13 PROCEDURE — G0476 HPV COMBO ASSAY CA SCREEN: HCPCS | Performed by: OBSTETRICS & GYNECOLOGY

## 2025-05-13 NOTE — PROGRESS NOTES
"Name: Agustin Lopez      : 1970      MRN: [id number]  Encounter Provider: Shea Galindo MD  Encounter Date: 2025   Encounter department: [facility]  :  Assessment & Plan  Encounter for screening mammogram for malignant neoplasm of breast    Orders:  •  Mammo screening bilateral w 3d and cad; Future    Encounter for well woman exam with routine gynecological exam    Orders:  •  Liquid-based pap, screening  pap done today   Screening for colon cancer    Orders:  •  Ambulatory Referral to Gastroenterology; Future  colonoscopy   0 cleared for  5 years   Uterovaginal prolapse       we discussed  possible management  options from  conservative management / pessary  / UROGYN for  surgical  consult   States for now  does not  desire any intervention       History of Present Illness   HPI  Agustin Lopez is a 54 y.o. female who presents for routine  annual  exam   States doing well   Can sometimes  feel uterus out of vagina when lifts heavy objects    History obtained from: patient    Review of Systems   Constitutional: Negative.    HENT: Negative.     Respiratory: Negative.     Cardiovascular: Negative.    Gastrointestinal: Negative.    Genitourinary:  Negative for decreased urine volume, difficulty urinating, dyspareunia, dysuria, enuresis, flank pain, frequency, genital sores, hematuria, menstrual problem, pelvic pain, urgency, vaginal bleeding, vaginal discharge and vaginal pain.   Musculoskeletal: Negative.    Neurological: Negative.    Psychiatric/Behavioral: Negative.          Objective   /66   Ht 5' 2\" (1.575 m)   Wt 77.1 kg (170 lb)   LMP  (LMP Unknown)   BMI 31.09 kg/m²      Physical Exam  Constitutional:       Appearance: Normal appearance.   HENT:      Head: Normocephalic and atraumatic.      Nose: Nose normal.   Eyes:      Conjunctiva/sclera: Conjunctivae normal.   Pulmonary:      Effort: Pulmonary effort is normal.   Chest:   Breasts:     Right: " Normal. No swelling, bleeding, inverted nipple, mass, nipple discharge, skin change or tenderness.      Left: Normal. No swelling, bleeding, inverted nipple, mass, nipple discharge, skin change or tenderness.   Abdominal:      General: There is no distension.      Palpations: Abdomen is soft. There is no mass.      Tenderness: There is no abdominal tenderness. There is no guarding or rebound.      Hernia: No hernia is present.   Genitourinary:     General: Normal vulva.      Vagina: No signs of injury and foreign body. Prolapsed vaginal walls present. No vaginal discharge, erythema, tenderness, bleeding or lesions.      Cervix: Normal.      Uterus: Normal. With uterine prolapse.       Adnexa: Right adnexa normal and left adnexa normal.   Neurological:      General: No focal deficit present.      Mental Status: She is alert and oriented to person, place, and time.   Psychiatric:         Mood and Affect: Mood normal.         Behavior: Behavior normal.

## 2025-05-16 LAB
LAB AP GYN PRIMARY INTERPRETATION: NORMAL
Lab: NORMAL

## 2025-05-19 ENCOUNTER — RESULTS FOLLOW-UP (OUTPATIENT)
Dept: OBGYN CLINIC | Facility: MEDICAL CENTER | Age: 55
End: 2025-05-19

## 2025-06-12 ENCOUNTER — TELEPHONE (OUTPATIENT)
Age: 55
End: 2025-06-12

## 2025-06-12 ENCOUNTER — PREP FOR PROCEDURE (OUTPATIENT)
Age: 55
End: 2025-06-12

## 2025-06-12 DIAGNOSIS — Z86.0100 HISTORY OF COLON POLYPS: Primary | ICD-10-CM

## 2025-06-12 NOTE — TELEPHONE ENCOUNTER
Scheduled date of colonoscopy (as of today):7/9/25    Physician performing colonoscopy:Dr Crane    Location of colonoscopy:[place]    Bowel prep reviewed with patient:miralafly/dulcolax    Instructions reviewed with patient by:pt requests prep instruction to be mailed    Clearances: N/A

## 2025-06-12 NOTE — TELEPHONE ENCOUNTER
25  Screened by: Dianelys Phillips    Referring Provider Dr Martinez    Pre- Screenin' 2 170 BMI 31.09    Has patient been referred for a routine screening Colonoscopy? yes  Is the patient between 45-75 years old? yes      Previous Colonoscopy yes   If yes:    Date: 2020    Facility:     Reason:       Does the patient want to see a Gastroenterologist prior to their procedure OR are they having any GI symptoms? no    Has the patient been hospitalized or had abdominal surgery in the past 6 months? no    Does the patient use supplemental oxygen? no    Does the patient take Coumadin, Lovenox, Plavix, Elliquis, Xarelto, or other blood thinning medication? no    Has the patient had a stroke, cardiac event, or stent placed in the past year? no      If patient is between 45yrs - 49yrs, please advise patient that we will have to confirm benefits & coverage with their insurance company for a routine screening colonoscopy.

## 2025-06-16 ENCOUNTER — PATIENT MESSAGE (OUTPATIENT)
Dept: GASTROENTEROLOGY | Facility: CLINIC | Age: 55
End: 2025-06-16

## 2025-06-25 ENCOUNTER — ANESTHESIA EVENT (OUTPATIENT)
Dept: ANESTHESIOLOGY | Facility: HOSPITAL | Age: 55
End: 2025-06-25

## 2025-06-25 ENCOUNTER — ANESTHESIA (OUTPATIENT)
Dept: ANESTHESIOLOGY | Facility: HOSPITAL | Age: 55
End: 2025-06-25

## 2025-07-09 ENCOUNTER — ANESTHESIA EVENT (OUTPATIENT)
Dept: GASTROENTEROLOGY | Facility: MEDICAL CENTER | Age: 55
End: 2025-07-09
Payer: COMMERCIAL

## 2025-07-09 ENCOUNTER — ANESTHESIA (OUTPATIENT)
Dept: GASTROENTEROLOGY | Facility: MEDICAL CENTER | Age: 55
End: 2025-07-09
Payer: COMMERCIAL

## 2025-07-09 ENCOUNTER — HOSPITAL ENCOUNTER (OUTPATIENT)
Dept: GASTROENTEROLOGY | Facility: MEDICAL CENTER | Age: 55
Setting detail: OUTPATIENT SURGERY
Discharge: HOME | End: 2025-07-09
Attending: INTERNAL MEDICINE
Payer: COMMERCIAL

## 2025-07-09 VITALS
OXYGEN SATURATION: 100 % | TEMPERATURE: 97 F | DIASTOLIC BLOOD PRESSURE: 77 MMHG | HEART RATE: 74 BPM | RESPIRATION RATE: 20 BRPM | SYSTOLIC BLOOD PRESSURE: 129 MMHG

## 2025-07-09 DIAGNOSIS — Z86.0100 HISTORY OF COLON POLYPS: ICD-10-CM

## 2025-07-09 PROCEDURE — 45385 COLONOSCOPY W/LESION REMOVAL: CPT | Performed by: INTERNAL MEDICINE

## 2025-07-09 PROCEDURE — 88305 TISSUE EXAM BY PATHOLOGIST: CPT | Performed by: PATHOLOGY

## 2025-07-09 RX ORDER — PROPOFOL 10 MG/ML
INJECTION, EMULSION INTRAVENOUS AS NEEDED
Status: DISCONTINUED | OUTPATIENT
Start: 2025-07-09 | End: 2025-07-09

## 2025-07-09 RX ORDER — SODIUM CHLORIDE, SODIUM LACTATE, POTASSIUM CHLORIDE, CALCIUM CHLORIDE 600; 310; 30; 20 MG/100ML; MG/100ML; MG/100ML; MG/100ML
INJECTION, SOLUTION INTRAVENOUS CONTINUOUS PRN
Status: DISCONTINUED | OUTPATIENT
Start: 2025-07-09 | End: 2025-07-09

## 2025-07-09 RX ORDER — LIDOCAINE HYDROCHLORIDE 10 MG/ML
INJECTION, SOLUTION EPIDURAL; INFILTRATION; INTRACAUDAL; PERINEURAL AS NEEDED
Status: DISCONTINUED | OUTPATIENT
Start: 2025-07-09 | End: 2025-07-09

## 2025-07-09 RX ADMIN — Medication 40 MG: at 14:33

## 2025-07-09 RX ADMIN — SODIUM CHLORIDE, SODIUM LACTATE, POTASSIUM CHLORIDE, AND CALCIUM CHLORIDE: .6; .31; .03; .02 INJECTION, SOLUTION INTRAVENOUS at 14:24

## 2025-07-09 RX ADMIN — PROPOFOL 120 MCG/KG/MIN: 10 INJECTION, EMULSION INTRAVENOUS at 14:28

## 2025-07-09 RX ADMIN — PROPOFOL 30 MG: 10 INJECTION, EMULSION INTRAVENOUS at 14:31

## 2025-07-09 RX ADMIN — LIDOCAINE HYDROCHLORIDE 50 MG: 10 INJECTION, SOLUTION EPIDURAL; INFILTRATION; INTRACAUDAL at 14:27

## 2025-07-09 RX ADMIN — PROPOFOL 100 MG: 10 INJECTION, EMULSION INTRAVENOUS at 14:27

## 2025-07-09 NOTE — H&P
History and Physical - [facility]  Agustin Lopez 54 y.o. female MRN: [id number]                  HPI: Agustin Lopez is a 54 y.o. year old female who presents for colonoscopy due to history of colon polyps.      REVIEW OF SYSTEMS: Per the HPI, and otherwise unremarkable.    Historical Information   Past Medical History[1]  Past Surgical History[2]  Social History   Social History     Substance and Sexual Activity   Alcohol Use No     Social History     Substance and Sexual Activity   Drug Use No     Tobacco Use History[3]  Family History[4]    Meds/Allergies     Not in a hospital admission.    Allergies[5]    Objective     not currently breastfeeding.      PHYSICAL EXAM    Gen: NAD  CV: RRR  CHEST: Clear  ABD: soft, NT/ND  EXT: no edema      ASSESSMENT/PLAN:  Agustin Lopez is a 54 y.o. year old female who presents for colonoscopy due to history of colon polyps. The patient is stable and optimized for the procedure, we reviewed risk and benefits. Risk include but not limited to infection, bleeding, perforation and missing a lesion.               [1]   Past Medical History:  Diagnosis Date    GERD (gastroesophageal reflux disease)     Hypothyroid    [2]   Past Surgical History:  Procedure Laterality Date    LAPAROSCOPY      TUBAL LIGATION     [3]   Social History  Tobacco Use   Smoking Status Never   Smokeless Tobacco Never   [4]   Family History  Problem Relation Name Age of Onset    Cancer Mother  50        Sarcoma    Uterine cancer Mother  56    No Known Problems Father      No Known Problems Daughter Malathi     No Known Problems Maternal Grandmother      No Known Problems Maternal Grandfather      No Known Problems Paternal Grandmother      No Known Problems Paternal Grandfather      No Known Problems Son      No Known Problems Son      No Known Problems Maternal Aunt Kamu     No Known Problems Paternal Aunt Caprice     No Known Problems Paternal Aunt Madelaine     No Known Problems Paternal Aunt Deanne     No  Known Problems Paternal Aunt Elma    [5] No Known Allergies

## 2025-07-09 NOTE — ANESTHESIA POSTPROCEDURE EVALUATION
Post-Op Assessment Note    CV Status:  Stable    Pain management: adequate       Mental Status:  Alert and awake   Hydration Status:  Euvolemic   PONV Controlled:  Controlled   Airway Patency:  Patent     Post Op Vitals Reviewed: Yes    No anethesia notable event occurred.    Staff: Anesthesiologist, CRNA         Last Filed PACU Vitals:  Vitals Value Taken Time   Temp     Pulse 74 07/09/25 15:07   /77 07/09/25 15:07   Resp 20 07/09/25 15:07   SpO2 100 % 07/09/25 15:07       Modified Velvet:     Vitals Value Taken Time   Activity 2 07/09/25 15:07   Respiration 2 07/09/25 15:07   Circulation 2 07/09/25 15:07   Consciousness 2 07/09/25 15:07   Oxygen Saturation 2 07/09/25 15:07     Modified Velvet Score: 10

## 2025-07-16 PROCEDURE — 88305 TISSUE EXAM BY PATHOLOGIST: CPT | Performed by: PATHOLOGY

## 2025-07-17 ENCOUNTER — TELEPHONE (OUTPATIENT)
Dept: GASTROENTEROLOGY | Facility: CLINIC | Age: 55
End: 2025-07-17

## 2025-07-17 NOTE — TELEPHONE ENCOUNTER
[facility]    7/17/25  9:43 AM  Result Note  10 year recall placed.  Tissue Exam  [facility]    7/17/25  9:43 AM  Result Note  Message left per HIPAA protocol with normal results and recommendations.  Tissue Exam  Se Crane MD to [facility] Clerical  [facility] Clinical (Selected Message)      7/16/25 10:59 PM  Note      Dear staff: Please place on recall for colonoscopy in 10 years.  Please communicate with patient that polyp removed during colonoscopy did not show any significant findings.  Good news.